# Patient Record
Sex: FEMALE | Race: WHITE | NOT HISPANIC OR LATINO | Employment: OTHER | URBAN - METROPOLITAN AREA
[De-identification: names, ages, dates, MRNs, and addresses within clinical notes are randomized per-mention and may not be internally consistent; named-entity substitution may affect disease eponyms.]

---

## 2017-05-10 ENCOUNTER — ALLSCRIPTS OFFICE VISIT (OUTPATIENT)
Dept: OTHER | Facility: OTHER | Age: 77
End: 2017-05-10

## 2017-08-16 ENCOUNTER — ALLSCRIPTS OFFICE VISIT (OUTPATIENT)
Dept: OTHER | Facility: OTHER | Age: 77
End: 2017-08-16

## 2017-08-16 DIAGNOSIS — Z00.00 ENCOUNTER FOR GENERAL ADULT MEDICAL EXAMINATION WITHOUT ABNORMAL FINDINGS: ICD-10-CM

## 2017-08-16 DIAGNOSIS — Z13.220 ENCOUNTER FOR SCREENING FOR LIPOID DISORDERS: ICD-10-CM

## 2017-08-16 DIAGNOSIS — Z13.1 ENCOUNTER FOR SCREENING FOR DIABETES MELLITUS: ICD-10-CM

## 2017-08-16 DIAGNOSIS — I10 ESSENTIAL (PRIMARY) HYPERTENSION: ICD-10-CM

## 2018-01-11 NOTE — RESULT NOTES
Verified Results  (1) COMPREHENSIVE METABOLIC PANEL [id number] 98:57ZW [facility] Show Order Number: [id number]     Test Name Result Flag Reference   GLUCOSE,RANDM 97 mg/dL     If the patient is fasting, the ADA then defines impaired fasting glucose as > 100 mg/dL and diabetes as > or equal to 123 mg/dL  SODIUM 138 mmol/L  136-145   POTASSIUM 3 6 mmol/L  3 5-5 3   CHLORIDE 102 mmol/L  100-108   CARBON DIOXIDE 31 mmol/L  21-32   ANION GAP (CALC) 5 mmol/L  4-13   BLOOD UREA NITROGEN 14 mg/dL  5-25   CREATININE 0 81 mg/dL  0 60-1 30   Standardized to IDMS reference method   CALCIUM 9 4 mg/dL  8 3-10 1   BILI, TOTAL 0 50 mg/dL  0 20-1 00   ALK PHOSPHATAS 94 U/L     ALT (SGPT) 18 U/L  12-78   AST(SGOT) 10 U/L  5-45   ALBUMIN 3 8 g/dL  3 5-5 0   TOTAL PROTEIN 7 3 g/dL  6 4-8 2   eGFR Non-African American      >60 0 ml/min/1 73sq m   - Patient Instructions: This is a fasting blood test  Water,black tea or black  coffee only after 9:00pm the night before test Drink 2 glasses of water the morning of test   National Kidney Disease Education Program recommendations are as follows:  GFR calculation is accurate only with a steady state creatinine  Chronic Kidney disease less than 60 ml/min/1 73 sq  meters  Kidney failure less than 15 ml/min/1 73 sq  meters  (1) LIPID PANEL, FASTING [id number] 01:53PM [facility] Show Order Number: [id number]     Test Name Result Flag Reference   CHOLESTEROL 194 mg/dL     HDL,DIRECT 30 mg/dL L 40-60   Specimen collection should occur prior to Metamizole administration due to the potential for falsely depressed results  LDL CHOLESTEROL CALCULATED 135 mg/dL H 0-100   - Patient Instructions: This is a fasting blood test  Water,black tea or black  coffee only after 9:00pm the night before test   Drink 2 glasses of water the morning of test     - Patient Instructions:  This is a fasting blood test  Water,black tea or black  coffee only after 9:00pm the night before test Drink 2 glasses of water the morning of test   Triglyceride:         Normal              <150 mg/dl       Borderline High    150-199 mg/dl       High               200-499 mg/dl       Very High          >499 mg/dl  Cholesterol:         Desirable        <200 mg/dl      Borderline High  200-239 mg/dl      High             >239 mg/dl  HDL Cholesterol:        High    >59 mg/dL      Low     <41 mg/dL  LDL CALCULATED:    This screening LDL is a calculated result  It does not have the accuracy of the Direct Measured LDL in the monitoring of patients with hyperlipidemia and/or statin therapy  Direct Measure LDL (QTD364) must be ordered separately in these patients  TRIGLYCERIDES 146 mg/dL  <=150   Specimen collection should occur prior to N-Acetylcysteine or Metamizole administration due to the potential for falsely depressed results

## 2018-01-13 VITALS
TEMPERATURE: 98.1 F | BODY MASS INDEX: 23.58 KG/M2 | HEART RATE: 77 BPM | SYSTOLIC BLOOD PRESSURE: 118 MMHG | DIASTOLIC BLOOD PRESSURE: 64 MMHG | WEIGHT: 131 LBS | OXYGEN SATURATION: 98 %

## 2018-01-15 NOTE — PROGRESS NOTES
Assessment    1  Encounter for preventive health examination (V70 0) (Z00 00)   2  Screening for diabetes mellitus (V77 1) (Z13 1)   3  Encounter for lipid screening for cardiovascular disease (V77 91,V81 2) (Z13 220,Z13 6)   4  Need for pneumococcal vaccination (V03 82) (Z23)    Plan  Alzheimers disease    · TraZODone HCl - 50 MG Oral Tablet; TAKE ONE TABLET AT BEDTIME AS  NEEDED TO HELP SLEEP  Benign essential hypertension, Encounter for lipid screening for cardiovascular disease,  Health Maintenance, Screening for diabetes mellitus    · (1) LIPID PANEL, FASTING; Status:Active; Requested for:[id number];   Depression screening    · *VB - PHQ-9 Tool; Status:Complete - Retrospective Authorization;   Done: [id number]  03:17PM  Encounter for lipid screening for cardiovascular disease, Health Maintenance, Screening  for diabetes mellitus    · (1) COMPREHENSIVE METABOLIC PANEL; Status:Active; Requested for:[id number];   Need for pneumococcal vaccination    · Prevnar 13 Intramuscular Suspension; INJECT 0 5  ML Intramuscular; To Be  Done: [id number]    Discussion/Summary  Impression: Subsequent Annual Wellness Visit, with preventive exam as well as age and risk appropriate counseling completed  Cardiovascular screening and counseling: the risks and benefits of screening were discussed, counseling was given on maintaining a healthy diet, counseling was given on maintaining a healthy weight, counseling was given on ways to improve cholesterol, counseling was given on ways to improve blood pressure, counseling was given on ways to improve exercise tolerance, due for a lipid panel and Dx - V81 2 Screen for CV Disorder  Diabetes screening and counseling: the risks and benefits of screening were discussed, counseling was given on maintaining a healthy diet, counseling was given on maintaining a healthy weight, counseling was given on ways to improve physical activity, due for blood glucose and Dx - V77 1 Screen for DM  Colorectal cancer screening and counseling: the risks and benefits of screening were discussed and the patient declines screening  Breast cancer screening and counseling: the risks and benefits of screening were discussed and the patient declines screening  Cervical cancer screening and counseling: screening not indicated  Osteoporosis screening and counseling: the risks and benefits of screening were discussed and the patient declines screening  Hepatitis C Screening: the patient was counseled on Hepatitis C screening  The patient declines Hepatitis C screening  Immunizations: the risks and benefits of pneumococcal vaccination were discussed with the patient and pneumococcal vaccine due today  Advance Directive Planning: complete and up to date  Patient Discussion: plan discussed with the patient, follow-up visit needed in 6 mo  Possible side effects of new medications were reviewed with the patient/guardian today  The treatment plan was reviewed with the patient/guardian  The patient/guardian understands and agrees with the treatment plan      Chief Complaint  MEDICARE WELLNESS VISIT      History of Present Illness  HPI: Pt with advanced Alzheimers disease  Family chooses to defer mammo, colonoscopy, DXA scans        Welcome to Medicare and Wellness Visits: The patient is being seen for the welcome to medicare visit  Medicare Screening and Risk Factors   Hospitalizations: no previous hospitalizations  Medicare Screening Tests Risk Questions   Drug and Alcohol Use: The patient has never smoked cigarettes  The patient reports never drinking alcohol  She has never used illicit drugs  Diet and Physical Activity: Current diet includes well balanced meals, 1 servings of whole grains per day and WATER ALL DAY  The patient does not exercise  Exercise: walking, VERY LITTLE     Mood Disorder and Cognitive Impairment Screening:   Cognitive impairment screening: difficulty learning/retaining new information, difficulty handling complex tasks, difficulty with reasoning, denies difficulty with spatial ability and orientation, difficulty with language and denies difficulty with behavior  Functional Ability/Level of Safety: Activities of daily living details: needs help using the phone, transportation help needed, needs help shopping, meal preparation help needed, needs help doing housework, needs help doing laundry, needs help managing medications and needs help managing money  Injury History: no previous fall  Home safety risk factors:  no loose rugs, no poor household lighting, no uneven floors and grab bars in the bathroom  Advance Directives: Advance directives: living will and advance directives  Co-Managers and Medical Equipment/Suppliers: See Patient Care Team      Patient Care Team    Care Team Member Role Specialty Office Number   Tamara Pyle   [phone], [employer]   [phone]   Conchita CORCORAN  Urology [phone], Evert Auhmada DO  Family Medicine [phone]     Review of Systems    Constitutional: no fever, no chills, no malaise, no fatigue and no anorexia  Head and Face: no facial pain and no facial pressure  Eyes: no eye pain and no blurred vision  ENT: no earache, no nasal congestion and no sore throat  Cardiovascular: no chest pain, no palpitations and no lower extremity edema  Respiratory: no shortness of breath, no wheezing and no cough  Gastrointestinal: no abdominal pain, no nausea, no vomiting, no diarrhea, no constipation, no bright red blood per rectum and no melena  Genitourinary: no dysuria, no urinary frequency, no urinary urgency and no flank pain  Musculoskeletal: no diffuse joint pain, no generalized muscle aches and no back pain  Integumentary and Breasts: no rashes and no skin lesions  Neurological: confusion, but no headache, no dizziness and no fainting  Psychiatric: no anxiety and no depression     Endocrine: no hot flashes and no night sweats  Hematologic and Lymphatic: no swollen glands, no tendency for easy bleeding, no tendency for easy bruising and no jaundice  Over the past 2 weeks, how often have you been bothered by the following problems? 1 ) Little interest or pleasure in doing things? Not at all    2 ) Feeling down, depressed or hopeless? Not at all  Active Problems    1  Alzheimers disease (331 0) (G30 9)   2  Benign essential hypertension (401 1) (I10)   3  Depression (311) (F32 9)   4  Flu vaccine need (V04 81) (Z23)   5  Increased frequency of urination (788 41) (R35 0)   6  Screening for genitourinary condition (V81 6) (Z13 89)   7  Screening for neurological condition (V80 09) (Z13 89)    Past Medical History    · History of Acute cystitis without hematuria (595 0) (N30 00)   · History of diverticulitis of colon (V12 79) (Z87 19)   · History of polymyalgia rheumatica (V13 59) (Z87 39)   · History of vertigo (V12 49) (S20 043)   · History of Hyperthyroidism (242 90) (E05 90)    The active problems and past medical history were reviewed and updated today  Surgical History    · History of Complete Colonoscopy   · History of Partial Colectomy - Sigmoid   · History of Thyroid Surgery Clint-Thyroidectomy   · History of Total Abdominal Hysterectomy With Removal Of Both Ovaries    The surgical history was reviewed and updated today  Family History  Mother    · Family history of Alzheimer Disease  Father    · Family history of Acute Myocardial Infarction (V17 3)   · Family history of Epilepsy And Recurrent Seizures (V17 2)  Brother    · Family history of Cancer    The family history was reviewed and updated today  Social History    · Being A Social Drinker   · Educational Level - Has High School Diploma   · Marital History - Currently    · 5 children   · Never A Smoker   · Occupation: Retired   · Office work for [employer]  The social history was reviewed and updated today   The social history was reviewed and is unchanged  Current Meds   1  Aspirin Low Dose 81 MG TABS; TAKE 1 TABLET DAILY; Therapy: [id number] to (Kaylin Urbano) Recorded   2  Melatonin 1 MG Oral Tablet; take 2 mg; Therapy: (Recorded:10May2017) to Recorded   3  TraZODone HCl - 50 MG Oral Tablet; TAKE ONE TABLET AT BEDTIME AS NEEDED TO   HELP SLEEP; Last Rx:15May2017 Ordered    The medication list was reviewed and updated today  Allergies    1  No Known Drug Allergies    Immunizations   ** Printed in Appendix #1 below  Vitals  Signs    Temperature: 98 2 F, Tympanic  Heart Rate: 66  Respiration: 14  Systolic: 894, RUE, Sitting  Diastolic: 68, RUE, Sitting  Height: 5 ft 3 in  Weight: 124 lb 5 oz  BMI Calculated: 22 02  BSA Calculated: 1 58  O2 Saturation: 95    Physical Exam    Constitutional   General appearance: No acute distress, well appearing and well nourished  Head and Face   Head and face: Normal     Palpation of the face and sinuses: No sinus tenderness  Eyes   Conjunctiva and lids: No swelling, erythema or discharge  Pupils and irises: Equal, round, reactive to light  Ears, Nose, Mouth, and Throat   External inspection of ears and nose: Normal     Otoscopic examination: Tympanic membranes translucent with normal light reflex  Canals patent without erythema  Hearing: Normal     Nasal mucosa, septum, and turbinates: Normal without edema or erythema  Lips, teeth, and gums: Normal, good dentition  Oropharynx: Normal with no erythema, edema, exudate or lesions  Neck   Neck: Supple, symmetric, trachea midline, no masses  Thyroid: Normal, no thyromegaly  Pulmonary   Respiratory effort: No increased work of breathing or signs of respiratory distress  Auscultation of lungs: Clear to auscultation  Cardiovascular   Auscultation of heart: Normal rate and rhythm, normal S1 and S2, no murmurs      Examination of extremities for edema and/or varicosities: Normal     Abdomen   Abdomen: Non-tender, no masses  Lymphatic   Palpation of lymph nodes in neck: No lymphadenopathy  Musculoskeletal   Gait and station: Normal     Digits and nails: Normal without clubbing or cyanosis  Joints, bones, and muscles: Normal     Skin   Skin and subcutaneous tissue: Normal without rashes or lesions  Neurologic   Cranial nerves: Cranial nerves II-XII intact  Psychiatric   Orientation to person, place, and time: Abnormal   Mental Status: disoriented to person, disoriented to place, disoriented to time, inadequate knowledge of current events, inadequate knowledge regarding past history and inadequate knowledge regarding vocabulary  Mood and affect: Abnormal   Mood and Affect: flat        Results/Data  *VB - PHQ-9 Tool [id number] 03:17PM Everton Garcia     Test Name Result Flag Reference   PHQ-9 Adult Depression Score -     PHQ-9 Adult Depression Screening Negative         Signatures   Electronically signed by : Monserrat Hinton DO; Aug 16 2017  4:21PM EST                       (Author)    Appendix #1     Patient: Clorinda Kehr ; : 1940; MRN: [id number]      1 2 3 4 5    Influenza  2012 05-Dec-2013 03-Nov-2014 15-Dec-2015 07-Sep-2016    PPSV  2012

## 2018-01-22 VITALS
BODY MASS INDEX: 22.03 KG/M2 | OXYGEN SATURATION: 95 % | SYSTOLIC BLOOD PRESSURE: 118 MMHG | TEMPERATURE: 98.2 F | HEIGHT: 63 IN | WEIGHT: 124.31 LBS | RESPIRATION RATE: 14 BRPM | DIASTOLIC BLOOD PRESSURE: 68 MMHG | HEART RATE: 66 BPM

## 2018-02-05 DIAGNOSIS — G47.00 INSOMNIA, UNSPECIFIED TYPE: Primary | ICD-10-CM

## 2018-02-05 RX ORDER — TRAZODONE HYDROCHLORIDE 50 MG/1
1 TABLET ORAL
COMMUNITY
Start: 2017-09-11 | End: 2018-02-05 | Stop reason: SDUPTHER

## 2018-02-06 RX ORDER — TRAZODONE HYDROCHLORIDE 50 MG/1
50 TABLET ORAL
Qty: 30 TABLET | Refills: 0 | OUTPATIENT
Start: 2018-02-06 | End: 2018-03-09 | Stop reason: SDUPTHER

## 2018-03-09 DIAGNOSIS — G47.00 INSOMNIA, UNSPECIFIED TYPE: ICD-10-CM

## 2018-03-11 RX ORDER — TRAZODONE HYDROCHLORIDE 50 MG/1
50 TABLET ORAL
Qty: 30 TABLET | Refills: 0 | Status: SHIPPED | OUTPATIENT
Start: 2018-03-11 | End: 2018-04-09 | Stop reason: SDUPTHER

## 2018-04-09 DIAGNOSIS — G47.00 INSOMNIA, UNSPECIFIED TYPE: ICD-10-CM

## 2018-04-09 RX ORDER — TRAZODONE HYDROCHLORIDE 50 MG/1
TABLET ORAL
Qty: 30 TABLET | Refills: 0 | Status: SHIPPED | OUTPATIENT
Start: 2018-04-09 | End: 2018-05-08 | Stop reason: SDUPTHER

## 2018-05-08 DIAGNOSIS — G47.00 INSOMNIA, UNSPECIFIED TYPE: ICD-10-CM

## 2018-05-08 RX ORDER — TRAZODONE HYDROCHLORIDE 50 MG/1
TABLET ORAL
Qty: 30 TABLET | Refills: 0 | OUTPATIENT
Start: 2018-05-08

## 2018-05-09 DIAGNOSIS — G47.00 INSOMNIA, UNSPECIFIED TYPE: ICD-10-CM

## 2018-05-09 RX ORDER — TRAZODONE HYDROCHLORIDE 50 MG/1
50 TABLET ORAL
Qty: 30 TABLET | Refills: 0 | Status: SHIPPED | OUTPATIENT
Start: 2018-05-09 | End: 2018-05-09 | Stop reason: SDUPTHER

## 2018-05-09 RX ORDER — TRAZODONE HYDROCHLORIDE 50 MG/1
50 TABLET ORAL
Qty: 30 TABLET | Refills: 0 | Status: SHIPPED | OUTPATIENT
Start: 2018-05-09 | End: 2018-06-06 | Stop reason: SDUPTHER

## 2018-06-05 ENCOUNTER — OFFICE VISIT (OUTPATIENT)
Dept: FAMILY MEDICINE CLINIC | Facility: CLINIC | Age: 78
End: 2018-06-05
Payer: MEDICARE

## 2018-06-05 DIAGNOSIS — K59.00 CONSTIPATION, UNSPECIFIED CONSTIPATION TYPE: Chronic | ICD-10-CM

## 2018-06-05 DIAGNOSIS — G30.1 LATE ONSET ALZHEIMER'S DISEASE WITHOUT BEHAVIORAL DISTURBANCE (HCC): Primary | Chronic | ICD-10-CM

## 2018-06-05 DIAGNOSIS — F51.01 PRIMARY INSOMNIA: Chronic | ICD-10-CM

## 2018-06-05 DIAGNOSIS — F02.80 LATE ONSET ALZHEIMER'S DISEASE WITHOUT BEHAVIORAL DISTURBANCE (HCC): Primary | Chronic | ICD-10-CM

## 2018-06-05 PROCEDURE — 99203 OFFICE O/P NEW LOW 30 MIN: CPT | Performed by: FAMILY MEDICINE

## 2018-06-05 NOTE — PROGRESS NOTES
Assessment/Plan:    No problem-specific Assessment & Plan notes found for this encounter  Diagnoses and all orders for this visit:    Late onset Alzheimer's disease without behavioral disturbance  Comments:  Stable; pt with good support network at home - at this time appears safe  Family aware that this is a progressive disease  Primary insomnia  Comments:  Stable at this time with Trazodone and OTC Melatonin  Constipation, unspecified constipation type  Comments:  Intermittent, stable  Can consider OTC Metamucil in the AMs with continued good PO hydration; OTC Miralax PRN  Subjective:      Patient ID: Beatriz Wheatley is a 76 y o  female  Pt presents today with her  (Wenceslao) as a new pt to the clinic, along with one of their daughters  All hx comes from [facility] and their daughter, as Zandra Romero has a hx of advanced Alzheimer's Disease  She is so far doing well in their home -> she has had no falls  Wenceslao is her primary caregiver, and they have a home health aide that comes in twice weekly for respite care  Annalisa's appetite is fair, and she hydrates well regularly  She sleeps through the night (approx 12 hours) with Trazodone and 5mg Melatonin at night  Wenceslao had all Alzheimer's type meds discontinued, as they were not helping  He is aware that she has a progressive disease, but reports that things are manageable at this time for him  Immunizations are UTD          The following portions of the patient's history were reviewed and updated as appropriate: allergies, current medications, past family history, past social history, past surgical history and problem list     Past Medical History:   Diagnosis Date    Acute cystitis without hematuria     Last assessed 12/19/15    Diverticulitis of colon     Hyperthyroidism     Polymyalgia rheumatica ([facility] [address] )     Vertigo     last assessed 01/15/2013     Past Surgical History:   Procedure Laterality Date    COLECTOMY      partial-sigmoid    COLONOSCOPY      THYROID SURGERY      Clint-Thyroidectomy    TOTAL ABDOMINAL HYSTERECTOMY W/ BILATERAL SALPINGOOPHORECTOMY         Current Outpatient Prescriptions:     traZODone (DESYREL) 50 mg tablet, Take 1 tablet (50 mg total) by mouth daily at bedtime as needed for sleep, Disp: 30 tablet, Rfl: 0    Allergies not on file    Family History   Problem Relation Age of Onset    Alzheimer's disease Mother     Heart attack Father     Seizures Father     Cancer Brother      mass in axilla     Social History   Substance Use Topics    Smoking status: Never Smoker    Smokeless tobacco: Not on file    Alcohol use Yes      Comment: social         Review of Systems   Constitutional: Negative for activity change  No recent illnesses  HENT: Negative for congestion  Respiratory: Negative for cough  Gastrointestinal: Positive for constipation  Occasional constipation - aided with PRN OTC Miralax  Psychiatric/Behavioral: Negative for agitation and sleep disturbance  Objective: There were no vitals taken for this visit  BP = 120/72 LA standing  Physical Exam   Constitutional: She appears well-developed and well-nourished  No distress  Pt alert but confused, moving about the room often, pleasant  Pt speaks only occasionally, one word at a time  HENT:   Head: Normocephalic and atraumatic  Eyes: Conjunctivae are normal    Cardiovascular: Normal rate, regular rhythm and normal heart sounds  Exam reveals no gallop and no friction rub  No murmur heard  Pulmonary/Chest: Effort normal and breath sounds normal  No respiratory distress  She has no wheezes  She has no rales  Neurological: She is alert  Skin: She is not diaphoretic  Nursing note and vitals reviewed

## 2018-06-06 DIAGNOSIS — G47.00 INSOMNIA, UNSPECIFIED TYPE: ICD-10-CM

## 2018-06-06 RX ORDER — TRAZODONE HYDROCHLORIDE 50 MG/1
50 TABLET ORAL
Qty: 90 TABLET | Refills: 1 | Status: SHIPPED | OUTPATIENT
Start: 2018-06-06 | End: 2018-07-02 | Stop reason: SDUPTHER

## 2018-07-02 DIAGNOSIS — G47.00 INSOMNIA, UNSPECIFIED TYPE: ICD-10-CM

## 2018-07-02 RX ORDER — TRAZODONE HYDROCHLORIDE 50 MG/1
50 TABLET ORAL
Qty: 90 TABLET | Refills: 1 | Status: SHIPPED | OUTPATIENT
Start: 2018-07-02 | End: 2018-10-10 | Stop reason: HOSPADM

## 2018-07-03 DIAGNOSIS — G47.00 INSOMNIA, UNSPECIFIED TYPE: ICD-10-CM

## 2018-07-03 RX ORDER — TRAZODONE HYDROCHLORIDE 50 MG/1
TABLET ORAL
Qty: 30 TABLET | Refills: 0 | OUTPATIENT
Start: 2018-07-03

## 2018-10-03 ENCOUNTER — HOSPITAL ENCOUNTER (EMERGENCY)
Facility: HOSPITAL | Age: 78
Discharge: HOME | End: 2018-10-03
Attending: EMERGENCY MEDICINE | Admitting: EMERGENCY MEDICINE
Payer: MEDICARE

## 2018-10-03 ENCOUNTER — APPOINTMENT (EMERGENCY)
Dept: CT IMAGING | Facility: HOSPITAL | Age: 78
End: 2018-10-03
Payer: MEDICARE

## 2018-10-03 ENCOUNTER — APPOINTMENT (EMERGENCY)
Dept: RADIOLOGY | Facility: HOSPITAL | Age: 78
End: 2018-10-03
Payer: MEDICARE

## 2018-10-03 VITALS
DIASTOLIC BLOOD PRESSURE: 83 MMHG | RESPIRATION RATE: 16 BRPM | HEART RATE: 57 BPM | SYSTOLIC BLOOD PRESSURE: 195 MMHG | OXYGEN SATURATION: 94 % | BODY MASS INDEX: 23.2 KG/M2 | WEIGHT: 130.95 LBS
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
## 2018-10-03 DIAGNOSIS — F03.90 DEMENTIA (HCC): ICD-10-CM

## 2018-10-03 PROCEDURE — 70450 CT HEAD/BRAIN W/O DYE: CPT

## 2018-10-03 PROCEDURE — 99284 EMERGENCY DEPT VISIT MOD MDM: CPT

## 2018-10-03 PROCEDURE — 72125 CT NECK SPINE W/O DYE: CPT

## 2018-10-03 PROCEDURE — 71046 X-RAY EXAM CHEST 2 VIEWS: CPT

## 2018-10-03 PROCEDURE — 72170 X-RAY EXAM OF PELVIS: CPT

## 2018-10-03 RX ORDER — ASPIRIN 81 MG/1
81 TABLET, CHEWABLE ORAL DAILY
COMMUNITY
End: 2019-05-03

## 2018-10-03 NOTE — ED PROVIDER NOTES
History  Chief Complaint   Patient presents with   Ernesto Chacon     from home via EMS after falling left side on floor,  called EMS, PT has alzheimers-takes ASA daily, C-collar on     76 YR FEMALE WITH DEMENTIA  [facility] [address]--   HEARD THUD THIS [address] --  NO RECENT ILLNESS/ NEW MEDS/ RECENT FALL --  FAMILY UNSURE OF ANY INJURY - ACTING AT HER BASELINE AT PRESENT         History provided by:  Spouse and relative  History limited by:  Dementia   used: No    Fall       Prior to Admission Medications   Prescriptions Last Dose Informant Patient Reported? Taking?   aspirin 81 mg chewable tablet   Yes Yes   Sig: Chew 81 mg daily   traZODone (DESYREL) 50 mg tablet   No Yes   Sig: Take 1 tablet (50 mg total) by mouth daily at bedtime as needed for sleep for up to 90 days      Facility-Administered Medications: None       Past Medical History:   Diagnosis Date    Acute cystitis without hematuria     Last assessed 12/19/15    Diverticulitis of colon     Hyperthyroidism     Polymyalgia rheumatica (HCC)     Vertigo     last assessed 01/15/2013       Past Surgical History:   Procedure Laterality Date    COLECTOMY      partial-sigmoid    COLONOSCOPY      THYROID SURGERY      Clint-Thyroidectomy    TOTAL ABDOMINAL HYSTERECTOMY W/ BILATERAL SALPINGOOPHORECTOMY         Family History   Problem Relation Age of Onset    Alzheimer's disease Mother     Heart attack Father     Seizures Father     Cancer Brother         mass in axilla     I have reviewed and agree with the history as documented  Social History   Substance Use Topics    Smoking status: Never Smoker    Smokeless tobacco: Never Used    Alcohol use Yes      Comment: social        Review of Systems   Constitutional: Negative  HENT: Negative  Eyes: Negative  Respiratory: Negative  Cardiovascular: Negative  Gastrointestinal: Negative  Endocrine: Negative  Genitourinary: Negative  Musculoskeletal: Negative  Skin: Negative  Allergic/Immunologic: Negative  Neurological: Negative  Hematological: Negative  Psychiatric/Behavioral: Negative  Physical Exam  Physical Exam   Constitutional: No distress  VSS- HTNSIVE-- PULSE OX 94 % ON RA- INTERPRETATION IS NORMAL- NO INTERVENTION    HENT:   Head: Normocephalic and atraumatic  Eyes: Pupils are equal, round, and reactive to light  Conjunctivae and EOM are normal  Right eye exhibits no discharge  Left eye exhibits no discharge  No scleral icterus  MM PINK   Neck: Normal range of motion  Neck supple  No JVD present  No tracheal deviation present  No thyromegaly present  NIO PMT C/T/L/S SPINE   Cardiovascular: Regular rhythm, normal heart sounds and intact distal pulses  Exam reveals no gallop and no friction rub  No murmur heard  Pulmonary/Chest: Effort normal and breath sounds normal  No stridor  No respiratory distress  She has no wheezes  She has no rales  She exhibits no tenderness    / LEFT  LOWER ANT AXILLARY LIEN VERY SMALL AREA OF ECCHYMOSIS- NT -    Abdominal: Soft  Bowel sounds are normal  She exhibits no distension and no mass  There is no tenderness  There is no rebound and no guarding  No hernia  SOFT- NT- NO PERITONEAL SIGNS--    Musculoskeletal: Normal range of motion  She exhibits no edema, tenderness or deformity  EQUAL BILATERAL RADIAL/DP PULSES- NO BLE EDEMA/CALF TENDERNESS/ASSYM/ ERYTHEMA    - MOVING  BUE/BLE PAIN FREEE- OLD DORSUM OF LEFT HAND ECCHYMOSIS- NOT FROM TODAY - LEFT HAND NT   Lymphadenopathy:     She has no cervical adenopathy  Neurological: She is alert  No cranial nerve deficit or sensory deficit  She exhibits normal muscle tone  Coordination normal    Skin: Skin is warm  Capillary refill takes less than 2 seconds  No rash noted  She is not diaphoretic  No erythema  No pallor     Psychiatric: She has a normal mood and affect  Her behavior is normal    Nursing note and vitals reviewed  Vital Signs  ED Triage Vitals [10/03/18 1703]   Temp Pulse Respirations Blood Pressure SpO2   -- 57 16 (!) 181/77 94 %      Temp src Heart Rate Source Patient Position - Orthostatic VS BP Location FiO2 (%)   -- Monitor Lying Right arm --      Pain Score       --           Vitals:    10/03/18 1703 10/03/18 1715   BP: (!) 181/77 (!) 195/83   Pulse: 57    Patient Position - Orthostatic VS: Lying        Visual Acuity      ED Medications  Medications - No data to display    Diagnostic Studies  Results Reviewed     None                 CT head without contrast    (Results Pending)   CT cervical spine without contrast    (Results Pending)   XR chest pa & lateral    (Results Pending)   XR pelvis ap only 1 or 2 views    (Results Pending)              Procedures  Procedures       Phone Contacts  ED Phone Contact    ED Course  ED Course as of Oct 03 1839   Wed Oct 03, 2018   1759 CXR PA/LAT-  NO FREE/SQ AIR- NO PTX/ PULM CONTUSION RIB FX SEEN     - PELVIS XRAY - NO FX SEEN     1833 - ER MD NOTE- PT TOLERATED AMBULATION AT BASELINE WITH ER MD AND                                 MDM  CritCare Time    Disposition  Final diagnoses:   None     ED Disposition     None      Follow-up Information    None         Patient's Medications   Discharge Prescriptions    No medications on file     No discharge procedures on file      ED Provider  Electronically Signed by           Freda Sunshine MD  10/03/18 1465       Freda Sunshine MD  10/04/18 4318

## 2018-10-03 NOTE — ED NOTES
Patient transported to [facility]  10/03/18 8470

## 2018-10-04 ENCOUNTER — TELEPHONE (OUTPATIENT)
Dept: FAMILY MEDICINE CLINIC | Facility: CLINIC | Age: 78
End: 2018-10-04

## 2018-10-04 NOTE — TELEPHONE ENCOUNTER
Patient's  called as his adrian Groves fell at home yesterday and was admitted to the ER  There she had x-rays and CT scans done but they did not find anything wrong with her and sent her back  Now the patient is in a lot of pain, and can hardly move   is looking for what you would advise him to do for his wife, he is concerned  I asked if he would like her to have an appointment and he worries that he cannot move her to get her here  please advise

## 2018-10-04 NOTE — TELEPHONE ENCOUNTER
If she is having trouble moving at all, and the pain is that excrutiating, I recommend him calling EMS  They would be able to place her on a mobile stretcher for transport  Pain medication can be prescribed, but if she is in that bad of shape, ER evaluation again is recommended  Thanks     David

## 2018-10-04 NOTE — TELEPHONE ENCOUNTER
Patient is going to see how she does tomorrow and if she starts to move then she will be okay  But if he has trouble moving her tomorrow then he will decide what should happen  He has given her a tylenol to see how she does

## 2018-10-07 ENCOUNTER — APPOINTMENT (EMERGENCY)
Dept: CT IMAGING | Facility: HOSPITAL | Age: 78
DRG: 689 | End: 2018-10-07
Payer: MEDICARE

## 2018-10-07 ENCOUNTER — HOSPITAL ENCOUNTER (INPATIENT)
Facility: HOSPITAL | Age: 78
LOS: 3 days | Discharge: NURSING, REHAB OR LONG-TERM CARE | DRG: 689 | End: 2018-10-10
Attending: EMERGENCY MEDICINE | Admitting: INTERNAL MEDICINE
Payer: MEDICARE

## 2018-10-07 DIAGNOSIS — K56.41 FECAL IMPACTION IN RECTUM (HCC): ICD-10-CM

## 2018-10-07 DIAGNOSIS — K59.00 CONSTIPATION, UNSPECIFIED CONSTIPATION TYPE: ICD-10-CM

## 2018-10-07 DIAGNOSIS — N39.0 URINARY TRACT INFECTION: ICD-10-CM

## 2018-10-07 DIAGNOSIS — S32.010A CLOSED COMPRESSION FRACTURE OF L1 LUMBAR VERTEBRA: Primary | ICD-10-CM

## 2018-10-07 DIAGNOSIS — R62.7 ADULT FAILURE TO THRIVE: ICD-10-CM

## 2018-10-07 PROBLEM — G93.41 METABOLIC ENCEPHALOPATHY: Status: ACTIVE | Noted: 2018-10-07

## 2018-10-07 PROBLEM — N30.00 ACUTE CYSTITIS WITHOUT HEMATURIA: Status: ACTIVE | Noted: 2018-10-07

## 2018-10-07 PROBLEM — E86.0 DEHYDRATION: Status: ACTIVE | Noted: 2018-10-07

## 2018-10-07 LAB
ALBUMIN SERPL BCP-MCNC: 3.4 G/DL (ref 3.5–5)
ALP SERPL-CCNC: 109 U/L (ref 46–116)
ALT SERPL W P-5'-P-CCNC: 30 U/L (ref 12–78)
ANION GAP SERPL CALCULATED.3IONS-SCNC: 9 MMOL/L (ref 4–13)
AST SERPL W P-5'-P-CCNC: 19 U/L (ref 5–45)
ATRIAL RATE: 57 BPM
BACTERIA UR QL AUTO: ABNORMAL /HPF
BASOPHILS # BLD AUTO: 0.05 THOUSANDS/ΜL (ref 0–0.1)
BASOPHILS NFR BLD AUTO: 1 % (ref 0–1)
BILIRUB SERPL-MCNC: 0.8 MG/DL (ref 0.2–1)
BILIRUB UR QL STRIP: NEGATIVE
BUN SERPL-MCNC: 31 MG/DL (ref 5–25)
CALCIUM SERPL-MCNC: 9.7 MG/DL (ref 8.3–10.1)
CHLORIDE SERPL-SCNC: 104 MMOL/L (ref 100–108)
CLARITY UR: ABNORMAL
CO2 SERPL-SCNC: 30 MMOL/L (ref 21–32)
COLOR UR: YELLOW
CREAT SERPL-MCNC: 0.69 MG/DL (ref 0.6–1.3)
EOSINOPHIL # BLD AUTO: 0.05 THOUSAND/ΜL (ref 0–0.61)
EOSINOPHIL NFR BLD AUTO: 1 % (ref 0–6)
ERYTHROCYTE [DISTWIDTH] IN BLOOD BY AUTOMATED COUNT: 12.7 % (ref 11.6–15.1)
GFR SERPL CREATININE-BSD FRML MDRD: 83 ML/MIN/1.73SQ M
GLUCOSE SERPL-MCNC: 118 MG/DL (ref 65–140)
GLUCOSE UR STRIP-MCNC: NEGATIVE MG/DL
HCT VFR BLD AUTO: 47.7 % (ref 34.8–46.1)
HGB BLD-MCNC: 15.5 G/DL (ref 11.5–15.4)
HGB UR QL STRIP.AUTO: ABNORMAL
IMM GRANULOCYTES # BLD AUTO: 0.04 THOUSAND/UL (ref 0–0.2)
IMM GRANULOCYTES NFR BLD AUTO: 0 % (ref 0–2)
KETONES UR STRIP-MCNC: NEGATIVE MG/DL
LACTATE SERPL-SCNC: 1.3 MMOL/L (ref 0.5–2)
LEUKOCYTE ESTERASE UR QL STRIP: ABNORMAL
LYMPHOCYTES # BLD AUTO: 0.98 THOUSANDS/ΜL (ref 0.6–4.47)
LYMPHOCYTES NFR BLD AUTO: 10 % (ref 14–44)
MCH RBC QN AUTO: 31.6 PG (ref 26.8–34.3)
MCHC RBC AUTO-ENTMCNC: 32.5 G/DL (ref 31.4–37.4)
MCV RBC AUTO: 97 FL (ref 82–98)
MONOCYTES # BLD AUTO: 0.75 THOUSAND/ΜL (ref 0.17–1.22)
MONOCYTES NFR BLD AUTO: 8 % (ref 4–12)
NEUTROPHILS # BLD AUTO: 7.68 THOUSANDS/ΜL (ref 1.85–7.62)
NEUTS SEG NFR BLD AUTO: 80 % (ref 43–75)
NITRITE UR QL STRIP: POSITIVE
NON-SQ EPI CELLS URNS QL MICRO: ABNORMAL /HPF
NRBC BLD AUTO-RTO: 0 /100 WBCS
P AXIS: 76 DEGREES
PH UR STRIP.AUTO: 6.5 [PH] (ref 4.5–8)
PLATELET # BLD AUTO: 286 THOUSANDS/UL (ref 149–390)
PMV BLD AUTO: 8.8 FL (ref 8.9–12.7)
POTASSIUM SERPL-SCNC: 3.4 MMOL/L (ref 3.5–5.3)
PR INTERVAL: 166 MS
PROT SERPL-MCNC: 7.5 G/DL (ref 6.4–8.2)
PROT UR STRIP-MCNC: ABNORMAL MG/DL
QRS AXIS: 51 DEGREES
QRSD INTERVAL: 80 MS
QT INTERVAL: 430 MS
QTC INTERVAL: 418 MS
RBC # BLD AUTO: 4.91 MILLION/UL (ref 3.81–5.12)
RBC #/AREA URNS AUTO: ABNORMAL /HPF
SODIUM SERPL-SCNC: 143 MMOL/L (ref 136–145)
SP GR UR STRIP.AUTO: 1.02 (ref 1–1.03)
T WAVE AXIS: 62 DEGREES
TROPONIN I SERPL-MCNC: 0.02 NG/ML
UROBILINOGEN UR QL STRIP.AUTO: 1 E.U./DL
VENTRICULAR RATE: 57 BPM
WBC # BLD AUTO: 9.55 THOUSAND/UL (ref 4.31–10.16)
WBC #/AREA URNS AUTO: ABNORMAL /HPF

## 2018-10-07 PROCEDURE — 85025 COMPLETE CBC W/AUTO DIFF WBC: CPT | Performed by: EMERGENCY MEDICINE

## 2018-10-07 PROCEDURE — 99223 1ST HOSP IP/OBS HIGH 75: CPT | Performed by: INTERNAL MEDICINE

## 2018-10-07 PROCEDURE — 87040 BLOOD CULTURE FOR BACTERIA: CPT | Performed by: EMERGENCY MEDICINE

## 2018-10-07 PROCEDURE — 71260 CT THORAX DX C+: CPT

## 2018-10-07 PROCEDURE — 96361 HYDRATE IV INFUSION ADD-ON: CPT

## 2018-10-07 PROCEDURE — 99285 EMERGENCY DEPT VISIT HI MDM: CPT

## 2018-10-07 PROCEDURE — 84484 ASSAY OF TROPONIN QUANT: CPT | Performed by: EMERGENCY MEDICINE

## 2018-10-07 PROCEDURE — 1123F ACP DISCUSS/DSCN MKR DOCD: CPT | Performed by: PHYSICIAN ASSISTANT

## 2018-10-07 PROCEDURE — 74177 CT ABD & PELVIS W/CONTRAST: CPT

## 2018-10-07 PROCEDURE — 93010 ELECTROCARDIOGRAM REPORT: CPT | Performed by: INTERNAL MEDICINE

## 2018-10-07 PROCEDURE — 93005 ELECTROCARDIOGRAM TRACING: CPT

## 2018-10-07 PROCEDURE — 80053 COMPREHEN METABOLIC PANEL: CPT | Performed by: EMERGENCY MEDICINE

## 2018-10-07 PROCEDURE — 96365 THER/PROPH/DIAG IV INF INIT: CPT

## 2018-10-07 PROCEDURE — 36415 COLL VENOUS BLD VENIPUNCTURE: CPT | Performed by: EMERGENCY MEDICINE

## 2018-10-07 PROCEDURE — 81001 URINALYSIS AUTO W/SCOPE: CPT | Performed by: EMERGENCY MEDICINE

## 2018-10-07 PROCEDURE — 83605 ASSAY OF LACTIC ACID: CPT | Performed by: EMERGENCY MEDICINE

## 2018-10-07 RX ORDER — POLYETHYLENE GLYCOL 3350 17 G/17G
17 POWDER, FOR SOLUTION ORAL DAILY
Status: DISCONTINUED | OUTPATIENT
Start: 2018-10-07 | End: 2018-10-10 | Stop reason: HOSPADM

## 2018-10-07 RX ORDER — SODIUM CHLORIDE 9 MG/ML
100 INJECTION, SOLUTION INTRAVENOUS CONTINUOUS
Status: DISCONTINUED | OUTPATIENT
Start: 2018-10-07 | End: 2018-10-08

## 2018-10-07 RX ORDER — LIDOCAINE 50 MG/G
1 PATCH TOPICAL DAILY
Status: DISCONTINUED | OUTPATIENT
Start: 2018-10-07 | End: 2018-10-10 | Stop reason: HOSPADM

## 2018-10-07 RX ORDER — LANOLIN ALCOHOL/MO/W.PET/CERES
3 CREAM (GRAM) TOPICAL
Status: DISCONTINUED | OUTPATIENT
Start: 2018-10-07 | End: 2018-10-10 | Stop reason: HOSPADM

## 2018-10-07 RX ORDER — ASPIRIN 81 MG/1
81 TABLET, CHEWABLE ORAL DAILY
Status: DISCONTINUED | OUTPATIENT
Start: 2018-10-07 | End: 2018-10-10 | Stop reason: HOSPADM

## 2018-10-07 RX ORDER — ACETAMINOPHEN 325 MG/1
650 TABLET ORAL EVERY 6 HOURS SCHEDULED
Status: DISCONTINUED | OUTPATIENT
Start: 2018-10-07 | End: 2018-10-10

## 2018-10-07 RX ORDER — BISACODYL 10 MG
10 SUPPOSITORY, RECTAL RECTAL DAILY
Status: DISCONTINUED | OUTPATIENT
Start: 2018-10-07 | End: 2018-10-10 | Stop reason: HOSPADM

## 2018-10-07 RX ORDER — LANOLIN ALCOHOL/MO/W.PET/CERES
3 CREAM (GRAM) TOPICAL
COMMUNITY
End: 2019-05-03 | Stop reason: SDUPTHER

## 2018-10-07 RX ORDER — ONDANSETRON 2 MG/ML
4 INJECTION INTRAMUSCULAR; INTRAVENOUS EVERY 6 HOURS PRN
Status: DISCONTINUED | OUTPATIENT
Start: 2018-10-07 | End: 2018-10-10 | Stop reason: HOSPADM

## 2018-10-07 RX ADMIN — BISACODYL 10 MG: 10 SUPPOSITORY RECTAL at 15:36

## 2018-10-07 RX ADMIN — LIDOCAINE 1 PATCH: 50 PATCH CUTANEOUS at 15:36

## 2018-10-07 RX ADMIN — ENOXAPARIN SODIUM 40 MG: 40 INJECTION SUBCUTANEOUS at 15:36

## 2018-10-07 RX ADMIN — SODIUM CHLORIDE 1000 ML: 0.9 INJECTION, SOLUTION INTRAVENOUS at 11:26

## 2018-10-07 RX ADMIN — ACETAMINOPHEN 650 MG: 325 TABLET, FILM COATED ORAL at 23:40

## 2018-10-07 RX ADMIN — GLYCERIN 1 SUPPOSITORY: 2 SUPPOSITORY RECTAL at 14:16

## 2018-10-07 RX ADMIN — CEFTRIAXONE SODIUM 1000 MG: 10 INJECTION, POWDER, FOR SOLUTION INTRAVENOUS at 12:57

## 2018-10-07 RX ADMIN — ACETAMINOPHEN 650 MG: 325 TABLET, FILM COATED ORAL at 15:35

## 2018-10-07 RX ADMIN — SODIUM CHLORIDE 100 ML/HR: 0.9 INJECTION, SOLUTION INTRAVENOUS at 15:37

## 2018-10-07 RX ADMIN — IOHEXOL 100 ML: 350 INJECTION, SOLUTION INTRAVENOUS at 12:38

## 2018-10-07 RX ADMIN — MELATONIN TAB 3 MG 3 MG: 3 TAB at 23:40

## 2018-10-07 RX ADMIN — ASPIRIN 81 MG 81 MG: 81 TABLET ORAL at 15:35

## 2018-10-07 RX ADMIN — POLYETHYLENE GLYCOL 3350 17 G: 17 POWDER, FOR SOLUTION ORAL at 15:36

## 2018-10-07 NOTE — ASSESSMENT & PLAN NOTE
Likely secondary to dehydration and urinary tract infection with history of  severe Alzheimer's dementia    Patient nonverbal at baseline  CT head and recent prior with a to the emergency room was negative  Continue with IV hydration and antibiotics  Fall precaution  No further neurological workup for now

## 2018-10-07 NOTE — ED NOTES
Patient incontinent of urine upon arrival  Patient cleaned, new brief applied       Kathleen Rajput 134, RN  10/07/18 5030

## 2018-10-07 NOTE — PLAN OF CARE
Problem: Potential for Falls  Goal: Patient will remain free of falls  INTERVENTIONS:  - Assess patient frequently for physical needs  -  Identify cognitive and physical deficits and behaviors that affect risk of falls  -  [place] fall precautions as indicated by assessment   - Educate patient/family on patient safety including physical limitations  - Instruct patient to call for assistance with activity based on assessment  - Modify environment to reduce risk of injury  - Consider OT/PT consult to assist with strengthening/mobility   Outcome: Progressing      Problem: INFECTION - ADULT  Goal: Absence or prevention of progression during hospitalization  INTERVENTIONS:  - Assess and monitor for signs and symptoms of infection  - Monitor lab/diagnostic results  - Monitor all insertion sites, i e  indwelling lines, tubes, and drains  - Monitor endotracheal (as able) and nasal secretions for changes in amount and color  - [place] appropriate cooling/warming therapies per order  - Administer medications as ordered  - Instruct and encourage patient and family to use good hand hygiene technique  - Identify and instruct in appropriate isolation precautions for identified infection/condition  Outcome: Progressing      Problem: SAFETY ADULT  Goal: Patient will remain free of falls  INTERVENTIONS:  - Assess patient frequently for physical needs  -  Identify cognitive and physical deficits and behaviors that affect risk of falls    -  [place] fall precautions as indicated by assessment   - Educate patient/family on patient safety including physical limitations  - Instruct patient to call for assistance with activity based on assessment  - Modify environment to reduce risk of injury  - Consider OT/PT consult to assist with strengthening/mobility   Outcome: Progressing    Goal: Maintain or return to baseline ADL function  INTERVENTIONS:  -  Assess patient's ability to carry out ADLs; assess patient's baseline for ADL function and identify physical deficits which impact ability to perform ADLs (bathing, care of mouth/teeth, toileting, grooming, dressing, etc )  - Assess/evaluate cause of self-care deficits   - Assess range of motion  - Assess patient's mobility; develop plan if impaired  - Assess patient's need for assistive devices and provide as appropriate  - Encourage maximum independence but intervene and supervise when necessary  ¯ Involve family in performance of ADLs  ¯ Assess for home care needs following discharge   ¯ Request OT consult to assist with ADL evaluation and planning for discharge  ¯ Provide patient education as appropriate  Outcome: Progressing    Goal: Maintain or return mobility status to optimal level  INTERVENTIONS:  - Assess patient's baseline mobility status (ambulation, transfers, stairs, etc )    - Identify cognitive and physical deficits and behaviors that affect mobility  - Identify mobility aids required to assist with transfers and/or ambulation (gait belt, sit-to-stand, lift, walker, cane, etc )  - [place] fall precautions as indicated by assessment  - Record patient progress and toleration of activity level on Mobility SBAR; progress patient to next Phase/Stage  - Instruct patient to call for assistance with activity based on assessment  - Request Rehabilitation consult to assist with strengthening/weightbearing, etc   Outcome: Progressing

## 2018-10-07 NOTE — ASSESSMENT & PLAN NOTE
Likely sustained after the patient had a fall last week    Conservative management  Physical therapy consultation will be obtained  Lidocaine patch and scheduled Tylenol for pain control

## 2018-10-07 NOTE — ASSESSMENT & PLAN NOTE
Patient has increased confusion with underlying dementia  Urinalysis shows findings suggestive of urinary tract infection  Family noticed foul-smelling dark urine  No recent hospitalization or instrumentation    Continue with IV ceftriaxone pending urine culture results

## 2018-10-07 NOTE — H&P
H&P- Mela Eden 1940, 76 y o  female MRN: [id number]    Unit/Bed#: [facility] Encounter: [id number]    Primary Care Provider: Kristi Jurado DO   Date and time admitted to hospital: 10/7/2018 10:22 AM        * Metabolic encephalopathy   Assessment & Plan    Likely secondary to dehydration and urinary tract infection with history of  severe Alzheimer's dementia  Patient nonverbal at baseline  CT head and recent prior with a to the emergency room was negative  Continue with IV hydration and antibiotics  Fall precaution  No further neurological workup for now     Late onset Alzheimer's disease without behavioral disturbance   Assessment & Plan    Patient has history late onset Alzheimer's dementia and needs assistance with  activities of daily living  She has been taking care of at home by her   Currently not on any dementia medications  Continue supportive care  Fall precautions  Patient is a level 3 DNR     Acute cystitis without hematuria   Assessment & Plan    Patient has increased confusion with underlying dementia  Urinalysis shows findings suggestive of urinary tract infection  Family noticed foul-smelling dark urine  No recent hospitalization or instrumentation  Continue with IV ceftriaxone pending urine culture results     Dehydration   Assessment & Plan    Patient has dehydration and failure to thrive  Continue with IV fluids  She is a level 3 DNR but family is undecided regarding feeding tube  Continue with ongoing discussions regarding goals of care     Closed compression fracture of L1 lumbar vertebra [facility])   Assessment & Plan    Likely sustained after the patient had a fall last week  Conservative management  Physical therapy consultation will be obtained  Lidocaine patch and scheduled Tylenol for pain control     Constipation   Assessment & Plan    Patient has a history of chronic constipation and takes MiraLax and Metamucil at home    Last bowel movement was on 10/2   did not report any nausea vomiting  Continue with IV hydration  Dulcolax suppository  May need fecal disimpaction           VTE Prophylaxis: Enoxaparin (Lovenox)  / sequential compression device   Code Status: Level 3 DNR  POLST: POLST form is not discussed and not completed at this time  Discussion with family:  Discussed with  and daughter at bedside  All questions addressed    Anticipated Length of Stay:  Patient will be admitted on an Inpatient basis with an anticipated length of stay of  >2 midnights  Justification for Hospital Stay:  IV hydration and antibiotics  Total Time for Visit, including Counseling / Coordination of Care: 30 minutes  Greater than 50% of this total time spent on direct patient counseling and coordination of care  Chief Complaint:   Poor oral intake  History of Present Illness:    Pamella Aguayo is a 76 y o  female who presents with late onset advanced Alzheimer's dementia and is nonverbal at baseline  She is taking care of at home by her   History was obtained from the  and daughter at bedside  At baseline the patient had been ambulatory without assistance till last week when she had a fall  She was brought to the emergency room and after trauma workup was discharged back home  Since then patient has been  increasingly lethargic with poor oral intake and failure to thrive  Family noticed that on moving her she was wincing in pain  No fever or chills were reported  She was refusing to eat or drink and has not been taking taking any medications for last 5 days  She was brought to the emergency room because of concern for dehydration  Family also noticed that her urine was dark and foul smelling  Her last bowel movement was on Tuesday  She usually takes Metamucil daily and as needed MiraLax for chronic constipation      Review of Systems:    Review of Systems   Unable to perform ROS: Dementia       Past Medical and Surgical History:     Past Medical History:   Diagnosis Date    Acute cystitis without hematuria     Last assessed 12/19/15    Diverticulitis of colon     Hyperthyroidism     Polymyalgia rheumatica (HCC)     Vertigo     last assessed 01/15/2013       Past Surgical History:   Procedure Laterality Date    COLECTOMY      partial-sigmoid    COLONOSCOPY      THYROID SURGERY      Clint-Thyroidectomy    TOTAL ABDOMINAL HYSTERECTOMY W/ BILATERAL SALPINGOOPHORECTOMY         Meds/Allergies:    Prior to Admission medications    Medication Sig Start Date End Date Taking? Authorizing Provider   aspirin 81 mg chewable tablet Chew 81 mg daily    Historical Provider, MD   traZODone (DESYREL) 50 mg tablet Take 1 tablet (50 mg total) by mouth daily at bedtime as needed for sleep for up to 90 days 7/2/18 10/3/18  [address] Denisa Koch,      Medications discussed with family at bedside    Allergies: No Known Allergies    Social History:     Marital Status: /Civil Union   Substance Use History:   History   Alcohol Use    Yes     Comment: social     History   Smoking Status    Never Smoker   Smokeless Tobacco    Never Used     History   Drug Use No       Family History:    non-contributory    Physical Exam:     Vitals:   Blood Pressure: 156/71 (10/07/18 1349)  Pulse: 76 (10/07/18 1349)  Temperature: 99 3 °F (37 4 °C) (10/07/18 1211)  Temp Source: Rectal (10/07/18 1211)  Respirations: 16 (10/07/18 1349)  Weight - Scale: 56 kg (123 lb 7 3 oz) (10/07/18 1027)  SpO2: 93 % (10/07/18 1349)    Physical Exam   Constitutional: No distress  HENT:   Head: Atraumatic  Dry mucous membranes   Eyes:   Not cooperative with exam   Neck: Neck supple  No JVD present  Cardiovascular: Normal rate and regular rhythm  Pulmonary/Chest: Effort normal    Abdominal: Soft  Bowel sounds are normal  She exhibits no distension  There is no tenderness  Musculoskeletal: She exhibits no edema or deformity     Neurological:   Patient has her eyes closed and resists opening them   Able to move extremities on painful stimulus  Skin: Skin is warm  She is not diaphoretic  Additional Data:     Lab Results: I have personally reviewed pertinent reports  Results from last 7 days  Lab Units 10/07/18  1125   WBC Thousand/uL 9 55   HEMOGLOBIN g/dL 15 5*   HEMATOCRIT % 47 7*   PLATELETS Thousands/uL 286   NEUTROS ABS Thousands/µL 7 68*   NEUTROS PCT % 80*   LYMPHS PCT % 10*   MONOS PCT % 8   EOS PCT % 1       Results from last 7 days  Lab Units 10/07/18  1125   SODIUM mmol/L 143   POTASSIUM mmol/L 3 4*   CHLORIDE mmol/L 104   CO2 mmol/L 30   BUN mg/dL 31*   CREATININE mg/dL 0 69   ANION GAP mmol/L 9   CALCIUM mg/dL 9 7   ALBUMIN g/dL 3 4*   TOTAL BILIRUBIN mg/dL 0 80   ALK PHOS U/L 109   ALT U/L 30   AST U/L 19                   Results from last 7 days  Lab Units 10/07/18  1125   LACTIC ACID mmol/L 1 3       Imaging: I have personally reviewed pertinent reports  CT chest abdomen pelvis w contrast   Final Result by Coco Earl MD (10/07 1333)   Addendum 1 of 1 by Coco Earl MD (10/07 1345)   ADDENDUM:      Purpose of addendum is to address the L1 compression fracture  Please see    report for CT reconstructions of the lumbar spine for further details  There is evidence of retropulsion  Final         1  Large stool ball within the rectum  Diverticulosis without active diverticulitis  No evidence of bowel obstruction  2   Suggestion of mild bladder wall thickening with some subtle mucosal hyperenhancement  While this may be related to underdistention, correlation with urinalysis recommended to exclude cystitis  3   Age-indeterminate compression fracture at L1 with approximately 30% height loss, appearance of which suggests likely subacute to chronic  Correlation for tenderness in this area recommended        Workstation performed: [id number]         CT recon only lumbar spine   Final Result by Coco Earl MD (10/07 1345)      L1 compression fracture with approximately 30% height loss and retropulsion of approximately 4 5 mm resulting in focal narrowing of the spinal canal at this level  This is age indeterminate and correlation for tenderness in this area recommended  The study was marked in [facility] for immediate notification  Workstation performed: [id number]             EKG, Pathology, and Other Studies Reviewed on Admission:   · EKG: NA    Allscripts / Epic Records Reviewed: Yes     ** Please Note: This note has been constructed using a voice recognition system   **

## 2018-10-07 NOTE — ED NOTES
Soiled odor noted from patient while in the room  Patient's brief checked, dry  Patient's brief changed       Sherley Lee RN  10/07/18 3418

## 2018-10-07 NOTE — ASSESSMENT & PLAN NOTE
Patient has a history of chronic constipation and takes MiraLax and Metamucil at home  Last bowel movement was on 10/2   did not report any nausea vomiting  Continue with IV hydration    Dulcolax suppository  May need fecal disimpaction

## 2018-10-07 NOTE — ED PROVIDER NOTES
History  Chief Complaint   Patient presents with    Failure To Thrive      called EMS, decreased PO intake since last hospital admission, generalized pain and weakness     47-year-old female with severe dementia presents to the emergency department for re-evaluation  Family is concerned that she is failing to thrive at home  She had a fall on Wednesday of last week  It is unclear how she fell but her  believes she tripped on a table and landed on her left side  She had x-rays and CT scans at that time was discharged back home  Since returning home the patient refuses to eat or drink  She has been refusing to lie on her left side and appears to be having pain when family attempts to move her  They have been able to get her up out of bed to walk to the bathroom on 2 occasions  The patient is mostly nonverbal   She has been wincing in pain with family attempts to clean her and move her  She seems to be most comfortable when lying on her right hip and shoulder  They have not noticed any new bruising  No reported fevers  Family concerned about dehydration  History provided by:  Patient, relative and medical records  History limited by:  Dementia   used: No    Malaise - 7 years or greater   Severity:  Severe  Onset quality:  Gradual  Timing:  Constant  Progression:  Unchanged  Chronicity:  New  Context: not change in medication and not recent infection    Relieved by:  Nothing  Worsened by:  Nothing  Ineffective treatments:  Pain relief and rest  Associated symptoms: difficulty walking    Associated symptoms: no syncope    Risk factors: neurologic disease    Risk factors: no new medications        Prior to Admission Medications   Prescriptions Last Dose Informant Patient Reported?  Taking?   aspirin 81 mg chewable tablet   Yes No   Sig: Chew 81 mg daily   melatonin 3 mg   Yes Yes   Sig: Take 3 mg by mouth daily at bedtime   traZODone (DESYREL) 50 mg tablet   No No Sig: Take 1 tablet (50 mg total) by mouth daily at bedtime as needed for sleep for up to 90 days      Facility-Administered Medications: None       Past Medical History:   Diagnosis Date    Acute cystitis without hematuria     Last assessed 12/19/15    Diverticulitis of colon     Hyperthyroidism     Polymyalgia rheumatica (HCC)     Vertigo     last assessed 01/15/2013       Past Surgical History:   Procedure Laterality Date    COLECTOMY      partial-sigmoid    COLONOSCOPY      THYROID SURGERY      Clint-Thyroidectomy    TOTAL ABDOMINAL HYSTERECTOMY W/ BILATERAL SALPINGOOPHORECTOMY         Family History   Problem Relation Age of Onset    Alzheimer's disease Mother     Heart attack Father     Seizures Father     Cancer Brother         mass in axilla     I have reviewed and agree with the history as documented  Social History   Substance Use Topics    Smoking status: Never Smoker    Smokeless tobacco: Never Used    Alcohol use No      Comment: social        Review of Systems   Unable to perform ROS: Dementia   Cardiovascular: Negative for syncope  Physical Exam  Physical Exam   Constitutional: She is oriented to person, place, and time  She appears well-developed and well-nourished  She appears distressed  Frail, elderly appearing, prefers to lay on her right hip winces when I attempt to roll over onto her back   HENT:   Head: Normocephalic  Nose: Nose normal    Mouth/Throat: Mucous membranes are dry  No oropharyngeal exudate  Eyes: Pupils are equal, round, and reactive to light  Conjunctivae and EOM are normal    Neck: Normal range of motion  Neck supple  Cardiovascular: Normal rate, regular rhythm, normal heart sounds and intact distal pulses  Pulmonary/Chest: Effort normal and breath sounds normal  No respiratory distress  She has no wheezes  Abdominal: Soft  Bowel sounds are normal  She exhibits no distension  There is no tenderness  There is no rebound and no guarding  Musculoskeletal: Normal range of motion  She exhibits no edema, tenderness or deformity  Back:    Lymphadenopathy:     She has no cervical adenopathy  Neurological: She is alert and oriented to person, place, and time  She has normal strength and normal reflexes  She displays no tremor  No cranial nerve deficit or sensory deficit  She exhibits normal muscle tone  She displays no seizure activity  Coordination normal    Skin: Skin is warm, dry and intact  No rash noted  Psychiatric: She has a normal mood and affect  Her behavior is normal  Judgment and thought content normal  Cognition and memory are impaired  Nonverbal   Nursing note and vitals reviewed        Vital Signs  ED Triage Vitals   Temperature Pulse Respirations Blood Pressure SpO2   10/07/18 1027 10/07/18 1027 10/07/18 1027 10/07/18 1027 10/07/18 1027   98 7 °F (37 1 °C) 61 18 (!) 187/77 91 %      Temp Source Heart Rate Source Patient Position - Orthostatic VS BP Location FiO2 (%)   10/07/18 1027 10/07/18 1027 10/07/18 1215 10/07/18 1027 --   Tympanic Monitor Lying Left arm       Pain Score       10/07/18 1027       No Pain           Vitals:    10/09/18 1629 10/09/18 2312 10/10/18 0700 10/10/18 1437   BP: 160/90 152/69 160/67 153/68   Pulse: 60 62 80 58   Patient Position - Orthostatic VS: Lying Lying Lying Lying       Visual Acuity      ED Medications  Medications   sodium chloride 0 9 % bolus 1,000 mL (0 mL Intravenous Stopped 10/7/18 1330)   iohexol (OMNIPAQUE) 350 MG/ML injection (MULTI-DOSE) 100 mL (100 mL Intravenous Given 10/7/18 1238)   ceftriaxone (ROCEPHIN) 1 g/50 mL in dextrose IVPB (0 mg Intravenous Stopped 10/7/18 1330)   glycerin (adult) rectal suppository 1 suppository (1 suppository Rectal Given 10/7/18 1416)   cefTRIAXone (ROCEPHIN) 1,000 mg in dextrose 5 % 50 mL IVPB (1,000 mg Intravenous New Bag 10/10/18 1236)   influenza vaccine, high-dose (FLUZONE HIGH-DOSE) IM injection KENISHA 0 5 mL (0 5 mL Intramuscular Given 10/8/18 1506)   potassium chloride (K-DUR,KLOR-CON) CR tablet 60 mEq (60 mEq Oral Given 10/9/18 1301)       Diagnostic Studies  Results Reviewed     Procedure Component Value Units Date/Time    Blood culture #1 [[id number]] Collected:  10/07/18 1130    Lab Status:  Preliminary result Specimen:  Blood from Arm, Left Updated:  10/11/18 1701     Blood Culture No Growth After 4 Days  Blood culture #2 [[id number]] Collected:  10/07/18 1125    Lab Status:  Preliminary result Specimen:  Blood from Arm, Right Updated:  10/11/18 1701     Blood Culture No Growth After 4 Days  Urine Microscopic [[id number]]  (Abnormal) Collected:  10/07/18 1212    Lab Status:  Final result Specimen:  Urine from Urine, Clean Catch Updated:  10/07/18 1233     RBC, UA 0-1 (A) /hpf      WBC, UA 4-10 (A) /hpf      Epithelial Cells Occasional /hpf      Bacteria, UA Innumerable (A) /hpf     UA w Reflex to Microscopic w Reflex to Culture [[id number]]  (Abnormal) Collected:  10/07/18 1212    Lab Status:  Final result Specimen:  Urine from Urine, Clean Catch Updated:  10/07/18 1218     Color, UA Yellow     Clarity, UA Turbid     Specific Gravity, UA 1 025     pH, UA 6 5     Leukocytes, UA Small (A)     Nitrite, UA Positive (A)     Protein, UA Trace (A) mg/dl      Glucose, UA Negative mg/dl      Ketones, UA Negative mg/dl      Urobilinogen, UA 1 0 E U /dl      Bilirubin, UA Negative     Blood, UA Moderate (A)    Lactic acid, plasma [[id number]]  (Normal) Collected:  10/07/18 1125    Lab Status:  Final result Specimen:  Blood from Arm, Right Updated:  10/07/18 1206     LACTIC ACID 1 3 mmol/L     Narrative:         Result may be elevated if tourniquet was used during collection      Troponin I [[id number]]  (Normal) Collected:  10/07/18 1125    Lab Status:  Final result Specimen:  Blood from Arm, Right Updated:  10/07/18 1202     Troponin I 0 02 ng/mL     Comprehensive metabolic panel [[id number]]  (Abnormal) Collected:  10/07/18 1125    Lab Status:  Final result Specimen: Blood from Arm, Right Updated:  10/07/18 1155     Sodium 143 mmol/L      Potassium 3 4 (L) mmol/L      Chloride 104 mmol/L      CO2 30 mmol/L      ANION GAP 9 mmol/L      BUN 31 (H) mg/dL      Creatinine 0 69 mg/dL      Glucose 118 mg/dL      Calcium 9 7 mg/dL      AST 19 U/L      ALT 30 U/L      Alkaline Phosphatase 109 U/L      Total Protein 7 5 g/dL      Albumin 3 4 (L) g/dL      Total Bilirubin 0 80 mg/dL      eGFR 83 ml/min/1 73sq m     Narrative:         National Kidney Disease Education Program recommendations are as follows:  GFR calculation is accurate only with a steady state creatinine  Chronic Kidney disease less than 60 ml/min/1 73 sq  meters  Kidney failure less than 15 ml/min/1 73 sq  meters  CBC and differential [[id number]]  (Abnormal) Collected:  10/07/18 1125    Lab Status:  Final result Specimen:  Blood from Arm, Right Updated:  10/07/18 1138     WBC 9 55 Thousand/uL      RBC 4 91 Million/uL      Hemoglobin 15 5 (H) g/dL      Hematocrit 47 7 (H) %      MCV 97 fL      MCH 31 6 pg      MCHC 32 5 g/dL      RDW 12 7 %      MPV 8 8 (L) fL      Platelets 412 Thousands/uL      nRBC 0 /100 WBCs      Neutrophils Relative 80 (H) %      Immat GRANS % 0 %      Lymphocytes Relative 10 (L) %      Monocytes Relative 8 %      Eosinophils Relative 1 %      Basophils Relative 1 %      Neutrophils Absolute 7 68 (H) Thousands/µL      Immature Grans Absolute 0 04 Thousand/uL      Lymphocytes Absolute 0 98 Thousands/µL      Monocytes Absolute 0 75 Thousand/µL      Eosinophils Absolute 0 05 Thousand/µL      Basophils Absolute 0 05 Thousands/µL                  CT chest abdomen pelvis w contrast   Final Result by Omar Lino MD (10/07 1333)   Addendum 1 of 1 by Omar Lino MD (10/07 1345)   ADDENDUM:      Purpose of addendum is to address the L1 compression fracture  Please see    report for CT reconstructions of the lumbar spine for further details  There is evidence of retropulsion        Final 1   Large stool ball within the rectum  Diverticulosis without active diverticulitis  No evidence of bowel obstruction  2   Suggestion of mild bladder wall thickening with some subtle mucosal hyperenhancement  While this may be related to underdistention, correlation with urinalysis recommended to exclude cystitis  3   Age-indeterminate compression fracture at L1 with approximately 30% height loss, appearance of which suggests likely subacute to chronic  Correlation for tenderness in this area recommended  Workstation performed: [id number]         CT recon only lumbar spine   Final Result by Christ Rossi MD (10/07 8665)      L1 compression fracture with approximately 30% height loss and retropulsion of approximately 4 5 mm resulting in focal narrowing of the spinal canal at this level  This is age indeterminate and correlation for tenderness in this area recommended  The study was marked in [facility] for immediate notification         Workstation performed: [id number]                    Procedures  ECG 12 Lead Documentation  Date/Time: 10/7/2018 11:13 AM  Performed by: Hermila Mckeon  Authorized by: MANOJ ALMENDAREZ     Indications / Diagnosis:  Failure to thrive  ECG reviewed by me, the ED Provider: yes    Patient location:  ED  Previous ECG:     Previous ECG:  Compared to current    Comparison ECG info:  2011  Interpretation:     Interpretation: non-specific    Rate:     ECG rate:  57    ECG rate assessment: bradycardic    Ectopy:     Ectopy: PAC    QRS:     QRS axis:  Normal  Conduction:     Conduction: normal    ST segments:     ST segments:  Normal           Phone Contacts  ED Phone Contact    ED Course                               MDM  Number of Diagnoses or Management Options  Adult failure to thrive: new and requires workup  Closed compression fracture of L1 lumbar vertebra [facility]): new and requires workup  Fecal impaction in rectum [facility]): new and requires workup  Urinary tract infection: new and requires workup     Amount and/or Complexity of Data Reviewed  Clinical lab tests: ordered and reviewed  Tests in the radiology section of CPT®: ordered and reviewed  Tests in the medicine section of CPT®: reviewed and ordered  Decide to obtain previous medical records or to obtain history from someone other than the patient: yes  Review and summarize past medical records: yes  Discuss the patient with other providers: yes  Independent visualization of images, tracings, or specimens: yes    Patient Progress  Patient progress: stable    CritCare Time    Disposition  Final diagnoses:   Closed compression fracture of L1 lumbar vertebra ([facility] )   Adult failure to thrive   Urinary tract infection   Fecal impaction in rectum ([facility] )     Time reflects when diagnosis was documented in both MDM as applicable and the Disposition within this note     Time User Action Codes Description Comment    10/7/2018  1:42 PM Cari Rios Add [S32 010A] Closed compression fracture of L1 lumbar vertebra ([facility] )     10/7/2018  1:42 PM Cari Rios Add [R62 7] Adult failure to thrive     10/7/2018  1:42 PM Cari Rios Add [N39 0] Urinary tract infection     10/7/2018  1:44 PM Cari Rios Add [K56 41] [facility]     10/10/2018  3:49 PM Jarrod Chou Add [K59 00] Constipation, unspecified constipation type       ED Disposition     ED Disposition Condition Comment    Admit  Case was discussed with Dr Chow and the patient's admission status was agreed to be Admission Status: inpatient status to the service of Dr Cecilia Deleon MD Documentation      Most Recent Value   Accepting Facility Name, [facility]    Transported by [employer] and Unit #)  [place] [address] Name, [facility]    Transported by [employer] and Unit #)  [facility], located within [facility]      Follow-up Information     Follow up With [employer] Details Why Contact Angelica Conner DO Family Medicine Schedule an appointment as soon as possible for a visit in 1 week(s)  [address] [facility]  [facility] [place]  [facility]      Kirsten Cruz MD Neurosurgery Schedule an appointment as soon as possible for a visit in 2 week(s)  [facility] [address]  [phone]            Discharge Medication List as of 10/10/2018  7:56 PM      START taking these medications    Details   acetaminophen (TYLENOL) 325 mg tablet Take 3 tablets (975 mg total) by mouth every 8 (eight) hours, Starting Wed 10/10/2018, No Print      bisacodyl (DULCOLAX) 10 mg suppository Insert 1 suppository (10 mg total) into the rectum daily as needed for constipation, Starting Wed 10/10/2018, Normal      cephalexin (KEFLEX) 500 mg capsule Take 1 capsule (500 mg total) by mouth every 12 (twelve) hours for 2 doses, Starting Thu 10/11/2018, Until Fri 10/12/2018, No Print      lidocaine (LIDODERM) 5 % Apply 1 patch topically daily Remove & Discard patch within 12 hours or as directed by MD, Starting Thu 10/11/2018, No Print         CONTINUE these medications which have NOT CHANGED    Details   melatonin 3 mg Take 3 mg by mouth daily at bedtime, Historical Med      aspirin 81 mg chewable tablet Chew 81 mg daily, Historical Med         STOP taking these medications       traZODone (DESYREL) 50 mg tablet Comments:   Reason for Stopping:               Outpatient Discharge Orders  Discharge Diet     Discharge Condtion:  Stabilized     Patient Aware of Diagnosis: Yes     Free of Communicable Disease:   Yes     Speech Therapy Eval and Treat     Physical Therapy Eval And Treat     Occupational Therapy Eval and Treat     Activity:  Per Rehab Recommendations     Future Lab Orders at [facility]         ED Provider  Electronically Signed by           Cristal Guo DO  10/11/18 2903

## 2018-10-07 NOTE — ASSESSMENT & PLAN NOTE
Patient has dehydration and failure to thrive  Continue with IV fluids  She is a level 3 DNR but family is undecided regarding feeding tube    Continue with ongoing discussions regarding goals of care

## 2018-10-08 PROBLEM — E87.0 HYPERNATREMIA: Status: ACTIVE | Noted: 2018-10-08

## 2018-10-08 LAB
ANION GAP SERPL CALCULATED.3IONS-SCNC: 8 MMOL/L (ref 4–13)
BASOPHILS # BLD AUTO: 0.06 THOUSANDS/ΜL (ref 0–0.1)
BASOPHILS NFR BLD AUTO: 1 % (ref 0–1)
BUN SERPL-MCNC: 29 MG/DL (ref 5–25)
CALCIUM SERPL-MCNC: 8.6 MG/DL (ref 8.3–10.1)
CHLORIDE SERPL-SCNC: 110 MMOL/L (ref 100–108)
CO2 SERPL-SCNC: 28 MMOL/L (ref 21–32)
CREAT SERPL-MCNC: 0.63 MG/DL (ref 0.6–1.3)
EOSINOPHIL # BLD AUTO: 0.27 THOUSAND/ΜL (ref 0–0.61)
EOSINOPHIL NFR BLD AUTO: 3 % (ref 0–6)
ERYTHROCYTE [DISTWIDTH] IN BLOOD BY AUTOMATED COUNT: 13 % (ref 11.6–15.1)
GFR SERPL CREATININE-BSD FRML MDRD: 86 ML/MIN/1.73SQ M
GLUCOSE SERPL-MCNC: 103 MG/DL (ref 65–140)
HCT VFR BLD AUTO: 41.1 % (ref 34.8–46.1)
HGB BLD-MCNC: 13.6 G/DL (ref 11.5–15.4)
IMM GRANULOCYTES # BLD AUTO: 0.02 THOUSAND/UL (ref 0–0.2)
IMM GRANULOCYTES NFR BLD AUTO: 0 % (ref 0–2)
LYMPHOCYTES # BLD AUTO: 1.73 THOUSANDS/ΜL (ref 0.6–4.47)
LYMPHOCYTES NFR BLD AUTO: 22 % (ref 14–44)
MCH RBC QN AUTO: 32.2 PG (ref 26.8–34.3)
MCHC RBC AUTO-ENTMCNC: 33.1 G/DL (ref 31.4–37.4)
MCV RBC AUTO: 97 FL (ref 82–98)
MONOCYTES # BLD AUTO: 0.67 THOUSAND/ΜL (ref 0.17–1.22)
MONOCYTES NFR BLD AUTO: 8 % (ref 4–12)
NEUTROPHILS # BLD AUTO: 5.25 THOUSANDS/ΜL (ref 1.85–7.62)
NEUTS SEG NFR BLD AUTO: 66 % (ref 43–75)
NRBC BLD AUTO-RTO: 0 /100 WBCS
PLATELET # BLD AUTO: 226 THOUSANDS/UL (ref 149–390)
PMV BLD AUTO: 9 FL (ref 8.9–12.7)
POTASSIUM SERPL-SCNC: 3.6 MMOL/L (ref 3.5–5.3)
RBC # BLD AUTO: 4.22 MILLION/UL (ref 3.81–5.12)
SODIUM SERPL-SCNC: 146 MMOL/L (ref 136–145)
WBC # BLD AUTO: 8 THOUSAND/UL (ref 4.31–10.16)

## 2018-10-08 PROCEDURE — G8979 MOBILITY GOAL STATUS: HCPCS

## 2018-10-08 PROCEDURE — G0008 ADMIN INFLUENZA VIRUS VAC: HCPCS | Performed by: INTERNAL MEDICINE

## 2018-10-08 PROCEDURE — G8978 MOBILITY CURRENT STATUS: HCPCS

## 2018-10-08 PROCEDURE — 97163 PT EVAL HIGH COMPLEX 45 MIN: CPT

## 2018-10-08 PROCEDURE — 90662 IIV NO PRSV INCREASED AG IM: CPT | Performed by: INTERNAL MEDICINE

## 2018-10-08 PROCEDURE — 80048 BASIC METABOLIC PNL TOTAL CA: CPT | Performed by: INTERNAL MEDICINE

## 2018-10-08 PROCEDURE — 99232 SBSQ HOSP IP/OBS MODERATE 35: CPT | Performed by: PHYSICIAN ASSISTANT

## 2018-10-08 PROCEDURE — 85025 COMPLETE CBC W/AUTO DIFF WBC: CPT | Performed by: INTERNAL MEDICINE

## 2018-10-08 RX ORDER — HYDRALAZINE HYDROCHLORIDE 20 MG/ML
5 INJECTION INTRAMUSCULAR; INTRAVENOUS EVERY 6 HOURS PRN
Status: DISCONTINUED | OUTPATIENT
Start: 2018-10-08 | End: 2018-10-10 | Stop reason: HOSPADM

## 2018-10-08 RX ORDER — SODIUM CHLORIDE 450 MG/100ML
100 INJECTION, SOLUTION INTRAVENOUS CONTINUOUS
Status: DISCONTINUED | OUTPATIENT
Start: 2018-10-08 | End: 2018-10-10

## 2018-10-08 RX ADMIN — INFLUENZA A VIRUS A/MICHIGAN/45/2015 X-275 (H1N1) ANTIGEN (FORMALDEHYDE INACTIVATED), INFLUENZA A VIRUS A/SINGAPORE/INFIMH-16-0019/2016 IVR-186 (H3N2) ANTIGEN (FORMALDEHYDE INACTIVATED), AND INFLUENZA B VIRUS B/MARYLAND/15/2016 BX-69A (A B/COLORADO/6/2017-LIKE VIRUS) ANTIGEN (FORMALDEHYDE INACTIVATED) 0.5 ML: 60; 60; 60 INJECTION, SUSPENSION INTRAMUSCULAR at 15:06

## 2018-10-08 RX ADMIN — LIDOCAINE 1 PATCH: 50 PATCH CUTANEOUS at 17:26

## 2018-10-08 RX ADMIN — SODIUM CHLORIDE 100 ML/HR: 0.45 INJECTION, SOLUTION INTRAVENOUS at 23:50

## 2018-10-08 RX ADMIN — SODIUM CHLORIDE 100 ML/HR: 0.45 INJECTION, SOLUTION INTRAVENOUS at 12:11

## 2018-10-08 RX ADMIN — POLYETHYLENE GLYCOL 3350 17 G: 17 POWDER, FOR SOLUTION ORAL at 08:54

## 2018-10-08 RX ADMIN — MELATONIN TAB 3 MG 3 MG: 3 TAB at 22:02

## 2018-10-08 RX ADMIN — ENOXAPARIN SODIUM 40 MG: 40 INJECTION SUBCUTANEOUS at 08:53

## 2018-10-08 RX ADMIN — BISACODYL 10 MG: 10 SUPPOSITORY RECTAL at 08:54

## 2018-10-08 RX ADMIN — ACETAMINOPHEN 650 MG: 325 TABLET, FILM COATED ORAL at 11:18

## 2018-10-08 RX ADMIN — CEFTRIAXONE 1000 MG: 1 INJECTION, POWDER, FOR SOLUTION INTRAMUSCULAR; INTRAVENOUS at 12:10

## 2018-10-08 RX ADMIN — ASPIRIN 81 MG 81 MG: 81 TABLET ORAL at 08:54

## 2018-10-08 NOTE — ASSESSMENT & PLAN NOTE
Patient has a history of chronic constipation and takes MiraLax and Metamucil at home  Last bowel movement was on 10/2  Continue with IV hydration    Dulcolax suppository  May need fecal disimpaction if no BM today

## 2018-10-08 NOTE — CASE MANAGEMENT
Initial Clinical Review    Admission: Date/Time/Statement: 10/7/18 @ 1346 Inpatient Written     Orders Placed This Encounter   Procedures    Inpatient Admission (expected length of stay for this patient is greater than two midnights)     Standing Status:   Standing     Number of Occurrences:   1     Order Specific Question:   Admitting Physician     Answer:   Nathan Isbell     Order Specific Question:   Level of Care     Answer:   Med Surg [16]     Order Specific Question:   Estimated length of stay     Answer:   More than 2 Midnights     Order Specific Question:   Certification     Answer:   I certify that inpatient services are medically necessary for this patient for a duration of greater than two midnights  See H&P and MD Progress Notes for additional information about the patient's course of treatment  ED: Date/Time/Mode of Arrival:   ED Arrival Information     Expected Arrival Acuity Means of Arrival Escorted By Service Admission Type    - 10/7/2018 10:21 Urgent Ambulance [facility] General Medicine Urgent    Arrival Complaint    -          Chief Complaint:   Chief Complaint   Patient presents with    Failure To Thrive      called EMS, decreased PO intake since last hospital admission, generalized pain and weakness       History of Illness: Simran Ponce is a 76 y o  female who presents with late onset advanced Alzheimer's dementia and is nonverbal at baseline  She is taking care of at home by her   History was obtained from the  and daughter at bedside  At baseline the patient had been ambulatory without assistance till last week when she had a fall  She was brought to the emergency room and after trauma workup was discharged back home  Since then patient has been  increasingly lethargic with poor oral intake and failure to thrive  Family noticed that on moving her she was wincing in pain  No fever or chills were reported    She was refusing to eat or drink and has not been taking taking any medications for last 5 days  She was brought to the emergency room because of concern for dehydration  Family also noticed that her urine was dark and foul smelling  Her last bowel movement was on Tuesday  She usually takes Metamucil daily and as needed MiraLax for chronic constipation      ED Vital Signs:   ED Triage Vitals   Temperature Pulse Respirations Blood Pressure SpO2   10/07/18 1027 10/07/18 1027 10/07/18 1027 10/07/18 1027 10/07/18 1027   98 7 °F (37 1 °C) 61 18 (!) 187/77 91 %      Temp Source Heart Rate Source Patient Position - Orthostatic VS BP Location FiO2 (%)   10/07/18 1027 10/07/18 1027 10/07/18 1215 10/07/18 1027 --   Tympanic Monitor Lying Left arm       Pain Score       10/07/18 1027       No Pain        Wt Readings from Last 1 Encounters:   10/07/18 55 4 kg (122 lb 2 2 oz)       Vital Signs (abnormal): temp 99 3, /74, 172/78 O2 sat 91% on RA    Abnormal Labs/Diagnostic Test Results:   HEMOGLOBIN 15 5*   HEMATOCRIT 47 7*   NEUTROS ABS 7 68*   NEUTROS PCT 80*   LYMPHS PCT 10*     POTASSIUM 3 4*   BUN 31*   ALBUMIN 3 4*     Leukocytes, UA Small     Nitrite, UA Positive     Protein, UA Trace     Blood, UA Moderate       RBC, UA 0-1     WBC, UA 4-10     Bacteria, UA Innumerable      CT chest abdomen pelvis w contrast  Final Result by Jacki Albarran MD (10/07 6682)   Addendum 1 of 1 by Jacki Albarran MD (10/07 8869)   ADDENDUM:       Purpose of addendum is to address the L1 compression fracture  Please see    report for CT reconstructions of the lumbar spine for further details  There is evidence of retropulsion        Final       1  Large stool ball within the rectum  Diverticulosis without active diverticulitis  No evidence of bowel obstruction            2  Suggestion of mild bladder wall thickening with some subtle mucosal hyperenhancement    While this may be related to underdistention, correlation with urinalysis recommended to exclude cystitis        3   Age-indeterminate compression fracture at L1 with approximately 30% height loss, appearance of which suggests likely subacute to chronic  Correlation for tenderness in this area recommended  CT recon only lumbar spine   L1 compression fracture with approximately 30% height loss and retropulsion of approximately 4 5 mm resulting in focal narrowing of the spinal canal at this level  This is age indeterminate and correlation for tenderness in this area recommended  ED Treatment:   Medication Administration from 10/07/2018 1021 to 10/07/2018 1428       Date/Time Order Dose Route Action     10/07/2018 1126 sodium chloride 0 9 % bolus 1,000 mL 1,000 mL Intravenous New Bag     10/07/2018 1238 iohexol (OMNIPAQUE) 350 MG/ML injection (MULTI-DOSE) 100 mL 100 mL Intravenous Given     10/07/2018 1257 ceftriaxone (ROCEPHIN) 1 g/50 mL in dextrose IVPB 1,000 mg Intravenous New Bag     10/07/2018 1416 glycerin (adult) rectal suppository 1 suppository 1 suppository Rectal Given          Past Medical/Surgical History: Active Ambulatory Problems     Diagnosis Date Noted    Late onset Alzheimer's disease without behavioral disturbance 06/05/2018     Resolved Ambulatory Problems     Diagnosis Date Noted    No Resolved Ambulatory Problems     Past Medical History:   Diagnosis Date    Acute cystitis without hematuria     Diverticulitis of colon     Hyperthyroidism     Polymyalgia rheumatica (HCC)     Vertigo        Admitting Diagnosis: Adult failure to thrive [R62 7]  Urinary tract infection [N39 0]  Failure to thrive in adult [R62 7]  Fecal impaction in rectum ([address] ) [K56 41]  Closed compression fracture of L1 lumbar vertebra ([address] ) [S32 010A]    Age/Sex: 76 y o  female    Assessment/Plan:   * Metabolic encephalopathy   Assessment & Plan     Likely secondary to dehydration and urinary tract infection with history of  severe Alzheimer's dementia    Patient nonverbal at baseline  CT head and recent prior with a to the emergency room was negative  Continue with IV hydration and antibiotics  Fall precaution  No further neurological workup for now      Late onset Alzheimer's disease without behavioral disturbance   Assessment & Plan     Patient has history late onset Alzheimer's dementia and needs assistance with  activities of daily living  She has been taking care of at home by her   Currently not on any dementia medications  Continue supportive care  Fall precautions  Patient is a level 3 DNR      Acute cystitis without hematuria   Assessment & Plan     Patient has increased confusion with underlying dementia  Urinalysis shows findings suggestive of urinary tract infection  Family noticed foul-smelling dark urine  No recent hospitalization or instrumentation  Continue with IV ceftriaxone pending urine culture results      Dehydration   Assessment & Plan     Patient has dehydration and failure to thrive  Continue with IV fluids  She is a level 3 DNR but family is undecided regarding feeding tube  Continue with ongoing discussions regarding goals of care      Closed compression fracture of L1 lumbar vertebra [facility])   Assessment & Plan     Likely sustained after the patient had a fall last week  Conservative management  Physical therapy consultation will be obtained  Lidocaine patch and scheduled Tylenol for pain control      Constipation   Assessment & Plan     Patient has a history of chronic constipation and takes MiraLax and Metamucil at home  Last bowel movement was on 10/2   did not report any nausea vomiting  Continue with IV hydration  Dulcolax suppository  May need fecal disimpaction         VTE Prophylaxis: Enoxaparin (Lovenox)  / sequential compression device     Anticipated Length of Stay:  Patient will be admitted on an Inpatient basis with an anticipated length of stay of  >2 midnights     Justification for Hospital Stay:  IV hydration and antibiotics    Admission Orders:  Dysphagia diet  Fall precautions  OOB with assist  Pt  Sequential compression device  Blood cxs pending  EKG    Scheduled Meds:   Current Facility-Administered Medications:  acetaminophen 650 mg Oral Q6H MARIYA   aspirin 81 mg Oral Daily   bisacodyl 10 mg Rectal Daily   cefTRIAXone 1,000 mg Intravenous Q24H   enoxaparin 40 mg Subcutaneous Daily   lidocaine 1 patch Topical Daily   melatonin 3 mg Oral HS   morphine injection 2 mg Intravenous Once PRN   ondansetron 4 mg Intravenous Q6H PRN   polyethylene glycol 17 g Oral Daily   sodium chloride 100 mL/hr Intravenous Continuous     Continuous Infusions:   sodium chloride 100 mL/hr Last Rate: 100 mL/hr (10/07/18 1537)     PRN Meds: morphine injection    ondansetron    Thank you,  [address] [place] [address] Utilization Review Department  Phone: [phone]; Fax [phone]  ATTENTION: Please call with any questions or concerns to [phone]  and carefully follow the prompts so that you are directed to the right person  Send all requests for admission clinical reviews, approved or denied determinations and any other requests to fax [phone]   All voicemails are confidential

## 2018-10-08 NOTE — PROGRESS NOTES
Patient unable to answer any questions due to severe Alzheimer's  Patient's  and daughter were present at time of admission and answered all questions  Patient's family requested that all four bed side rails be up at all times  Patient in bed, bed alarmed, bedside table and call bell within reach  Will continue to monitor patient

## 2018-10-08 NOTE — ASSESSMENT & PLAN NOTE
Patient has dehydration and failure to thrive exacerbated by pain  Better oral intake today  Monitor closely  Family is undecided regarding feeding tube      Continue with ongoing discussions regarding goals of care as needed

## 2018-10-08 NOTE — PHYSICAL THERAPY NOTE
PHYSICAL THERAPY EVALUATION  NAME:  Gwen Self  DATE: 10/08/18    AGE:   76 y o   Mrn:   [id number]  ADMIT DX:  Adult failure to thrive [R62 7]  Urinary tract infection [N39 0]  Failure to thrive in adult [R62 7]  Fecal impaction in rectum ([facility] ) [K56 41]  Closed compression fracture of L1 lumbar vertebra ([facility] ) [S32 010A]    Past Medical History:   Diagnosis Date    Acute cystitis without hematuria     Last assessed 12/19/15    Diverticulitis of colon     Hyperthyroidism     Polymyalgia rheumatica ([facility] )     Vertigo     last assessed 01/15/2013     Length Of Stay: 1  Performed at least 2 patient identifiers during session: ID bracelet and (pt unable to verbalize information)  PHYSICAL THERAPY EVALUATION :    10/08/18 1403   Note Type   Note type Eval only   Pain Assessment   Pain Assessment FLACC   Pain Rating: FLACC (Rest) - Face 0   Pain Rating: FLACC (Rest) - Legs 1   Pain Rating: FLACC (Rest) - Activity 1   Pain Rating: FLACC (Rest) - Cry 0   Pain Rating: FLACC (Rest) - Consolability 0   Score: FLACC (Rest) 2   Pain Rating: FLACC (Activity) - Face 0   Pain Rating: FLACC (Activity) - Legs 1   Pain Rating: FLACC (Activity) - Activity 1   Pain Rating: FLACC (Activity) - Cry 0   Pain Rating: FLACC (Activity) - Consolability 0   Score: FLACC (Activity) 2   Home Living   Type of Home (Pt unable to provide information)   Additional Comments Pt with limited command following and verbal response   Prior Function   Lives With Spouse   Receives Help From Family   Comments Per H&P:   At baseline the patient had been ambulatory without assistance till last week when she had a fall  Per prior ED vision pt was not using any DME For ambulation @ home   Restrictions/Precautions   Braces or Orthoses (no brace ordered for pt)   Other Precautions Fall Risk;Bed Alarm; Chair Alarm;Cognitive  (? visual impairment)   General   Family/Caregiver Present No   Cognition   Overall Cognitive Status Impaired Arousal/Participation Poorly responsive   Orientation Level Unable to assess   Memory Unable to assess   Following Commands Follows one step commands inconsistently   Comments Pt more responsive wiht no distractions (TV muted), 3 word responses, but diffiuclty folliwng gestures, especially to R side  RLE Assessment   RLE Assessment (Pt unable to follow MMT for strength assessment)   RLE Overall PROM   R Hip Flexion WFL   R Hip ABduction WFL   R Knee Flexion WFL   R Knee Extension WFL   R Ankle Dorsiflexion WFL   LLE Assessment   LLE Assessment (Pt unable to follow MMT for strength assessment)   LLE Overall PROM   L Hip Flexion WFL   L Hip ABduction WFL   L Knee Flexion WFL   L Knee Extension WFL   L Ankle Dorsiflexion WFL   Coordination   Movements are Fluid and Coordinated 0   Coordination and Movement Description rigid throughout   Sensation (hearing appears WFL; ? vision w/ tracking to R field)   Light Touch   RLE Light Touch (due to limited command following)   LLE Light Touch (due to limited command following)   Bed Mobility   Sit to Supine 5  Supervision   Additional items Assist x 1; Increased time required;Verbal cues  (but pt laying down w/head at foot of bed  )   Additional Comments Pt needed max A to lay down to R side due to limtied command follow via pt  Transfers   Sit to Stand (due to limited command following)   Ambulation/Elevation   Gait pattern Not tested  (due to limited participation once on egde of bed)   Balance   Static Sitting Good   Endurance Deficit   Endurance Deficit Yes   Endurance Deficit Description Pt closed eyes once head @ pillow, appeared fatigued, needed to be awakened to participate      Activity Tolerance   Activity Tolerance Patient limited by fatigue   Medical Staff Made Aware spoke to Alba Mcgrath from case management   Nurse Made Aware spoke to [address] who reported pt required A of 1 for transfers and short distance amb to chair   Assessment   Prognosis Fair   Problem List Impaired balance;Decreased endurance;Pain; Impaired vision;Decreased safety awareness; Impaired judgement;Decreased cognition;Decreased mobility; Decreased coordination   Barriers to Discharge (? amount of A that spouse is able to provide on DC)   Barriers to Discharge Comments ? stairs at home   Goals   Patient Goals none stated by pt/limited cognition/verbalization   STG Expiration Date 10/12/18   Treatment Day 0   Plan   Treatment/Interventions LE strengthening/ROM; Therapeutic exercise; Bed mobility; Equipment eval/education;Patient/family training;Cognitive reorientation; Endurance training;Continued evaluation;Spoke to nursing;Spoke to case management   PT Frequency 5x/wk   Recommendation   Recommendation Post acute IP rehab;Home with family support   Equipment Recommended Other (Comment)  (TBD, likely hand hold A)   Barthel Index   Feeding 5   Bathing 0   Grooming Score 0   Dressing Score 0   Bladder Score 0   Bowels Score 0   Toilet Use Score 0   Transfers (Bed/Chair) Score 0   Mobility (Level Surface) Score 0   Stairs Score 0   Barthel Index Score 5   (Please find full objective findings from PT assessment regarding body systems outlined above)  Assessment: Pt is a 76 y o  female seen for PT evaluation s/p admit to [facility] on 32/6/8577 w/ Metabolic encephalopathy  Order placed for PT  Prior to admission, pt lived with spouse and used no device for mobility  Upon evaluation: Pt requires fluctuating 1person assistance for bed mobilty and did not particiapte with transfers nor ambulation during [facility]    Pt's clinical presentation is currently unstable/unpredictable given the functional mobility deficits above, especially decreased endurance, gait deviations, decreased activity tolerance and decreased functional mobility tolerance, coupled with fall risks including hx of falls, impaired balance, impaired coordination, impaired judgement, decreased safety awareness and decreased cognition, and combined with medical complications of multiple readmissions and recent L1 compression fx (no bracing ordered)  Pt to benefit from continued skilled PT tx while in hospital and upon DC to address deficits as defined above and maximize level of functional mobility  From PT/mobility standpoint, recommendation at time of d/c would be inpatient rehab vs  Home PT pending progress in order to maximize pt's functional independence and consistency w/ mobility in order to facilitate return to PLOF  Recommend OT consult and continued mobility trials likely with hand hold A, limited verbal instruction, focusing on task specific goal directed mobility activities to perform eval completion         Goals: Pt will: Perform bed mobility tasks to Supervision to decrease burden of care and increase Indep in prior living environment  Assess transfers and gait to decrease burden of care and increase Indep in prior living environment  Assess steps as appropriate and set goal as indicated  The following objective measures were performed on IE: Barthel Index 5/100  Comorbidities affecting pt's physical performance at time of assessment include: limited communication, limited cognition and polymyalgia rheumatic, recent L1 compression fx  Personal factors affecting pt at time of IE include: advanced age, behavioral pattern, inability to perform IADLs, inability to perform ADLs, limited insight into impairments and recent fall(s)       Carol Ann Hendricks, PT, DPT

## 2018-10-08 NOTE — ASSESSMENT & PLAN NOTE
Likely secondary to dehydration and urinary tract infection with history of  severe Alzheimer's dementia    Clinically improving  Patient nonverbal at baseline  CT head and recent prior with a to the emergency room was negative  Continue with IV hydration and antibiotics  Fall precaution  No further neurological workup for now

## 2018-10-08 NOTE — ASSESSMENT & PLAN NOTE
Patient has increased confusion with underlying dementia  Urinalysis shows findings suggestive of urinary tract infection      Lab did not receive specimen for culture  Continue IV Ceftriaxone for 3 days total

## 2018-10-08 NOTE — PLAN OF CARE
Problem: PHYSICAL THERAPY ADULT  Goal: Performs mobility at highest level of function for planned discharge setting  See evaluation for individualized goals  Treatment/Interventions: LE strengthening/ROM, Therapeutic exercise, Bed mobility, Equipment eval/education, Patient/family training, Cognitive reorientation, Endurance training, Continued evaluation, Spoke to nursing, Spoke to case management  Equipment Recommended: Other (Comment) (TBD, likely hand hold A)       See flowsheet documentation for full assessment, interventions and recommendations  Prognosis: Fair  Problem List: Impaired balance, Decreased endurance, Pain, Impaired vision, Decreased safety awareness, Impaired judgement, Decreased cognition, Decreased mobility, Decreased coordination  Assessment: Pt is a 76 y o  female seen for PT evaluation s/p admit to [facility] on 49/3/3890 w/ Metabolic encephalopathy  Order placed for PT  Prior to admission, pt lived with spouse and used no device for mobility  Upon evaluation: Pt requires fluctuating 1person assistance for bed mobilty and did not particiapte with transfers nor ambulation during sesison  Pt's clinical presentation is currently unstable/unpredictable given the functional mobility deficits above, especially decreased endurance, gait deviations, decreased activity tolerance and decreased functional mobility tolerance, coupled with fall risks including hx of falls, impaired balance, impaired coordination, impaired judgment, decreased safety awareness and decreased cognition, and combined with medical complications of multiple readmissions and recent L1 compression fx (no bracing ordered)  Pt to benefit from continued skilled PT tx while in hospital and upon DC to address deficits as defined above and maximize level of functional mobility   From PT/mobility standpoint, recommendation at time of d/c would be inpatient rehab vs  Home PT pending progress in order to maximize pt's functional independence and consistency w/ mobility in order to facilitate return to PLOF  Recommend OT consult and continued mobility trials likely with hand hold A, limited verbal instruction, focusing on task specific goal directed mobility activities to perform eval completion       Barriers to Discharge:  (? amount of A that spouse is able to provide on DC)  Barriers to Discharge Comments: ? stairs at home  Recommendation: Post acute IP rehab, Home with family support          See flowsheet documentation for full assessment

## 2018-10-08 NOTE — ASSESSMENT & PLAN NOTE
Patient has history late onset Alzheimer's dementia and needs assistance with  activities of daily living  She has been taking care of at home by her     Currently not on any dementia medications  Continue supportive care  Fall precautions  Patient is a level 3 DNR

## 2018-10-08 NOTE — PROGRESS NOTES
Progress Note - Mela Eden 1940, 76 y o  female MRN: [id number]    Unit/Bed#: -01 Encounter: [id number]    Primary Care Provider: Kristi Jurado DO   Date and time admitted to hospital: 10/7/2018 10:22 AM        * Metabolic encephalopathy   Assessment & Plan    Likely secondary to dehydration and urinary tract infection with history of  severe Alzheimer's dementia  Clinically improving  Patient nonverbal at baseline  CT head and recent prior with a to the emergency room was negative  Continue with IV hydration and antibiotics  Fall precaution  No further neurological workup for now     Acute cystitis without hematuria   Assessment & Plan    Patient has increased confusion with underlying dementia  Urinalysis shows findings suggestive of urinary tract infection  Lab did not receive specimen for culture  Continue IV Ceftriaxone for 3 days total     Dehydration   Assessment & Plan    Patient has dehydration and failure to thrive exacerbated by pain  Better oral intake today  Monitor closely  Family is undecided regarding feeding tube  Continue with ongoing discussions regarding goals of care as needed     Hypernatremia   Assessment & Plan    Change to 1/2 NS     Constipation   Assessment & Plan    Patient has a history of chronic constipation and takes MiraLax and Metamucil at home  Last bowel movement was on 10/2  Continue with IV hydration  Dulcolax suppository  May need fecal disimpaction if no BM today     Closed compression fracture of L1 lumbar vertebra [facility])   Assessment & Plan    Likely sustained after the patient had a fall last week    Conservative management  Physical therapy consultation will be obtained  Lidocaine patch and scheduled Tylenol for pain control     Late onset Alzheimer's disease without behavioral disturbance   Assessment & Plan    Patient has history late onset Alzheimer's dementia and needs assistance with  activities of daily living  She has been taking care of at home by her   Currently not on any dementia medications  Continue supportive care  Fall precautions  Patient is a level 3 DNR       VTE Pharmacologic Prophylaxis:   Pharmacologic: Enoxaparin (Lovenox)  Mechanical VTE Prophylaxis in Place: Yes    Patient Centered Rounds: I have performed bedside rounds with nursing staff today  Discussions with Specialists or Other Care Team Provider: case management    Education and Discussions with Family / Patient:  called with update    Time Spent for Care: 30 minutes  More than 50% of total time spent on counseling and coordination of care as described above  Current Length of Stay: 1 day(s)    Current Patient Status: Inpatient   Certification Statement: The patient will continue to require additional inpatient hospital stay due to IV antibiotics    Discharge Plan: Await clinical course and PT eval     Code Status: Level 3 - DNAR and DNI      Subjective:   More alert today per nursing  Able to walk to chair with assistance  Ate 50% of breakfast this morning  Non verbal at baseline  Still without a BM  Objective:     Vitals:   Temp (24hrs), Av °F (37 2 °C), Min:98 4 °F (36 9 °C), Max:99 3 °F (37 4 °C)    HR:  [58-76] 59  Resp:  [16-18] 18  BP: (156-176)/(71-78) 172/78  SpO2:  [92 %-96 %] 96 %  Body mass index is 21 64 kg/m²  Input and Output Summary (last 24 hours): Intake/Output Summary (Last 24 hours) at 10/08/18 1046  Last data filed at 10/08/18 1031   Gross per 24 hour   Intake             1175 ml   Output              492 ml   Net              683 ml       Physical Exam:     Physical Exam   Constitutional: No distress  HENT:   Head: Normocephalic and atraumatic  Cardiovascular: Normal rate and regular rhythm  Exam reveals no friction rub  No murmur heard  Pulmonary/Chest: Effort normal and breath sounds normal  No respiratory distress  She has no wheezes  Abdominal: Soft   Bowel sounds are normal  She exhibits no distension  There is no tenderness  There is no rebound and no guarding  Musculoskeletal: She exhibits no edema  Neurological: She is alert  She exhibits normal muscle tone  Coordination normal    Non verbal   Skin: Skin is warm and dry  No rash noted  Psychiatric: Cognition and memory are impaired  She exhibits abnormal recent memory and abnormal remote memory  Nursing note and vitals reviewed  Additional Data:     Labs:      Results from last 7 days  Lab Units 10/08/18  0521   WBC Thousand/uL 8 00   HEMOGLOBIN g/dL 13 6   HEMATOCRIT % 41 1   PLATELETS Thousands/uL 226   NEUTROS PCT % 66   LYMPHS PCT % 22   MONOS PCT % 8   EOS PCT % 3       Results from last 7 days  Lab Units 10/08/18  0521 10/07/18  1125   SODIUM mmol/L 146* 143   POTASSIUM mmol/L 3 6 3 4*   CHLORIDE mmol/L 110* 104   CO2 mmol/L 28 30   BUN mg/dL 29* 31*   CREATININE mg/dL 0 63 0 69   ANION GAP mmol/L 8 9   CALCIUM mg/dL 8 6 9 7   ALBUMIN g/dL  --  3 4*   TOTAL BILIRUBIN mg/dL  --  0 80   ALK PHOS U/L  --  109   ALT U/L  --  30   AST U/L  --  19                   Results from last 7 days  Lab Units 10/07/18  1125   LACTIC ACID mmol/L 1 3           * I Have Reviewed All Lab Data Listed Above  * Additional Pertinent Lab Tests Reviewed:  [facility] Admission Reviewed    Imaging:    Imaging Reports Reviewed Today Include: CT scan  Imaging Personally Reviewed by Myself Includes:  none    Recent Cultures (last 7 days):           Last 24 Hours Medication List:     Current Facility-Administered Medications:  acetaminophen 650 mg Oral Q6H Robert Beltre MD   aspirin 81 mg Oral Daily Jt Devries MD   bisacodyl 10 mg Rectal Daily Jt Devries MD   cefTRIAXone 1,000 mg Intravenous Q24H Jt Devries MD   enoxaparin 40 mg Subcutaneous Daily Jt Devries MD   hydrALAZINE 5 mg Intravenous Q6H PRN Albin Zafar PA-C   lidocaine 1 patch Topical Daily Jt Devries MD   melatonin 3 mg Oral HS Shirin ALYCE Cunningham   morphine injection 2 mg Intravenous Once PRN Cari Rios DO   ondansetron 4 mg Intravenous Q6H PRN Margeret Bence, MD   polyethylene glycol 17 g Oral Daily Margeret Bence, MD   sodium chloride 100 mL/hr Intravenous Continuous Saw Santos PA-C        Today, Patient Was Seen By: Saw Santos PA-C    ** Please Note: Dictation voice to text software may have been used in the creation of this document   **

## 2018-10-09 PROBLEM — E87.6 HYPOKALEMIA: Status: ACTIVE | Noted: 2018-10-09

## 2018-10-09 PROBLEM — E87.0 HYPERNATREMIA: Status: RESOLVED | Noted: 2018-10-08 | Resolved: 2018-10-09

## 2018-10-09 LAB
ANION GAP SERPL CALCULATED.3IONS-SCNC: 8 MMOL/L (ref 4–13)
BUN SERPL-MCNC: 15 MG/DL (ref 5–25)
CALCIUM SERPL-MCNC: 8.8 MG/DL (ref 8.3–10.1)
CHLORIDE SERPL-SCNC: 103 MMOL/L (ref 100–108)
CO2 SERPL-SCNC: 30 MMOL/L (ref 21–32)
CREAT SERPL-MCNC: 0.55 MG/DL (ref 0.6–1.3)
ERYTHROCYTE [DISTWIDTH] IN BLOOD BY AUTOMATED COUNT: 12.8 % (ref 11.6–15.1)
GFR SERPL CREATININE-BSD FRML MDRD: 89 ML/MIN/1.73SQ M
GLUCOSE SERPL-MCNC: 101 MG/DL (ref 65–140)
HCT VFR BLD AUTO: 42.3 % (ref 34.8–46.1)
HGB BLD-MCNC: 14.1 G/DL (ref 11.5–15.4)
MCH RBC QN AUTO: 32 PG (ref 26.8–34.3)
MCHC RBC AUTO-ENTMCNC: 33.3 G/DL (ref 31.4–37.4)
MCV RBC AUTO: 96 FL (ref 82–98)
PLATELET # BLD AUTO: 242 THOUSANDS/UL (ref 149–390)
PMV BLD AUTO: 9.2 FL (ref 8.9–12.7)
POTASSIUM SERPL-SCNC: 3.1 MMOL/L (ref 3.5–5.3)
RBC # BLD AUTO: 4.41 MILLION/UL (ref 3.81–5.12)
SODIUM SERPL-SCNC: 141 MMOL/L (ref 136–145)
WBC # BLD AUTO: 8.43 THOUSAND/UL (ref 4.31–10.16)

## 2018-10-09 PROCEDURE — 99232 SBSQ HOSP IP/OBS MODERATE 35: CPT | Performed by: INTERNAL MEDICINE

## 2018-10-09 PROCEDURE — 80048 BASIC METABOLIC PNL TOTAL CA: CPT | Performed by: PHYSICIAN ASSISTANT

## 2018-10-09 PROCEDURE — 85027 COMPLETE CBC AUTOMATED: CPT | Performed by: PHYSICIAN ASSISTANT

## 2018-10-09 RX ORDER — POTASSIUM CHLORIDE 20 MEQ/1
60 TABLET, EXTENDED RELEASE ORAL ONCE
Status: COMPLETED | OUTPATIENT
Start: 2018-10-09 | End: 2018-10-09

## 2018-10-09 RX ADMIN — ASPIRIN 81 MG 81 MG: 81 TABLET ORAL at 08:53

## 2018-10-09 RX ADMIN — CEFTRIAXONE 1000 MG: 1 INJECTION, POWDER, FOR SOLUTION INTRAMUSCULAR; INTRAVENOUS at 13:03

## 2018-10-09 RX ADMIN — SODIUM CHLORIDE 100 ML/HR: 0.45 INJECTION, SOLUTION INTRAVENOUS at 22:54

## 2018-10-09 RX ADMIN — ACETAMINOPHEN 650 MG: 325 TABLET, FILM COATED ORAL at 23:05

## 2018-10-09 RX ADMIN — ACETAMINOPHEN 650 MG: 325 TABLET, FILM COATED ORAL at 07:47

## 2018-10-09 RX ADMIN — ENOXAPARIN SODIUM 40 MG: 40 INJECTION SUBCUTANEOUS at 08:53

## 2018-10-09 RX ADMIN — POLYETHYLENE GLYCOL 3350 17 G: 17 POWDER, FOR SOLUTION ORAL at 08:53

## 2018-10-09 RX ADMIN — MELATONIN TAB 3 MG 3 MG: 3 TAB at 22:54

## 2018-10-09 RX ADMIN — ACETAMINOPHEN 650 MG: 325 TABLET, FILM COATED ORAL at 13:01

## 2018-10-09 RX ADMIN — LIDOCAINE 1 PATCH: 50 PATCH CUTANEOUS at 17:18

## 2018-10-09 RX ADMIN — BISACODYL 10 MG: 10 SUPPOSITORY RECTAL at 08:53

## 2018-10-09 RX ADMIN — POTASSIUM CHLORIDE 60 MEQ: 1500 TABLET, EXTENDED RELEASE ORAL at 13:01

## 2018-10-09 RX ADMIN — ACETAMINOPHEN 650 MG: 325 TABLET, FILM COATED ORAL at 17:17

## 2018-10-09 NOTE — ASSESSMENT & PLAN NOTE
· Likely sustained after the patient had a fall last week    · Conservative management  · PT recommending rehab - choices given to case management   · Lidocaine patch and scheduled Tylenol for pain control

## 2018-10-09 NOTE — PLAN OF CARE
Problem: DISCHARGE PLANNING - CARE MANAGEMENT  Goal: Discharge to post-acute care or home with appropriate resources  INTERVENTIONS:  - Conduct assessment to determine patient/family and health care team treatment goals, and need for post-acute services based on payer coverage, community resources, and patient preferences, and barriers to discharge  - Address psychosocial, clinical, and financial barriers to discharge as identified in assessment in conjunction with the patient/family and health care team  - Arrange appropriate level of post-acute services according to patients   needs and preference and payer coverage in collaboration with the physician and health care team  - Communicate with and update the patient/family, physician, and health care team regarding progress on the discharge plan  - Arrange appropriate transportation to post-acute venues  Outcome: Progressing  LOS 2  Patient is not a readmission  Patient is a bundle  CM spoke with patient's  at bedside  Patient lives in [facility] with her  in a house  Patient's living arrangement is all on the first floor  There is 1 JESE home  Patient does not use any assistive devices  Patient is dependent for all ADLs   provides all her care  He has a non-skilled home health aide that provides assistance twice a week  Denies history of VNA and inpatient rehab services  Patient uses [address] in [place]  Patient has prescription insurance and has been able to afford her medications  Denies history of drug/alcohol abuse and mental illness history  Patient has an advance directive/living will  Copy requested from family  Patient does not work or drive  Depending on patient's plan of discharge, family with transport vs  BLS  GRECIA spoke with patient's  at bedside re:DCP  Patient's  is unsure of his plan of care for patient  He would like to speak with his children prior to making a decision   SNF list reviewed with patient's   CM educated  of VNA and HHA options   stated he has been taking care of his wife for years and will be able to continue care as long as she can ambulate  CM will f/u with  and family  CM reviewed with patient's  re:bundle program  Copy of bundle program provided to patient's   All questions/concerns answered at bedside  CM reviewed discharge planning process including the following: identifying caregivers at home, preference for d/c planning needs, [facility] to Bed program, availability of treatment team to discuss questions or concerns patient and/or family may have regarding diagnosis, plan of care, old or new medications and discharge planning   CM will continue to follow for care coordination and update assessment as necessary

## 2018-10-09 NOTE — SOCIAL WORK
CM spoke with patient's  at bedside  Patient's  updated that [facility], [facility], and [facility] have bed availability  Patient's  would like 1  [facility] 2  [facility] and 3  [facility] referrals sent  GRECIA spoke with Lilliana Chanel from [address] [place] [address]  Lilliana Chanel will reach out to patient's  for a tour of the facility  Patient's  updated with plan of care

## 2018-10-09 NOTE — ASSESSMENT & PLAN NOTE
· POA, Likely secondary to dehydration and urinary tract infection with history of  severe Alzheimer's dementia  Clinically improving   Patient nonverbal at baseline  · Will need placement at discharge   · Follow up with case management   · Will complete antibiotics tomorrow

## 2018-10-09 NOTE — ASSESSMENT & PLAN NOTE
· Patient has history late onset Alzheimer's dementia and needs assistance with  activities of daily living  She has been taking care of at home by her    Currently not on any dementia medications  · Continue supportive care  · Fall precautions

## 2018-10-09 NOTE — PROGRESS NOTES
[facility]  Progress Note - Allison Dukes 1940, 76 y o  female MRN: [id number]    Unit/Bed#: -01 Encounter: [id number]    Primary Care Provider: Tammy Ng DO   Date and time admitted to hospital: 10/7/2018 10:22 AM        * Metabolic encephalopathy   Assessment & Plan    · POA, Likely secondary to dehydration and urinary tract infection with history of  severe Alzheimer's dementia  Clinically improving  Patient nonverbal at baseline  · Will need placement at discharge   · Follow up with case management   · Will complete antibiotics tomorrow      Late onset Alzheimer's disease without behavioral disturbance   Assessment & Plan    · Patient has history late onset Alzheimer's dementia and needs assistance with  activities of daily living  She has been taking care of at home by her   Currently not on any dementia medications  · Continue supportive care  · Fall precautions     Acute cystitis without hematuria   Assessment & Plan    · POA, patient returning to baseline mental functioning as per   Urinalysis shows findings suggestive of urinary tract infection  Lab did not receive specimen for culture  · Continue IV Ceftriaxone for 3 days total - will complete tomorrow      Closed compression fracture of L1 lumbar vertebra (HCC)   Assessment & Plan    · Likely sustained after the patient had a fall last week  · Conservative management  · PT recommending rehab - choices given to case management   · Lidocaine patch and scheduled Tylenol for pain control     Hypokalemia   Assessment & Plan    · 3 1 today   · Will give 60 mEq of KCl PO - recheck BMP in AM        VTE Pharmacologic Prophylaxis:   Pharmacologic: Enoxaparin (Lovenox)  Mechanical VTE Prophylaxis in Place: Yes    Patient Centered Rounds: I have performed bedside rounds with nursing staff today      Discussions with Specialists or Other Care Team Provider: Discussed with RN, CM    Education and Discussions with Family / Patient: Discussed with patient's  and daughter     Time Spent for Care: 30 minutes  More than 50% of total time spent on counseling and coordination of care as described above  Current Length of Stay: 2 day(s)    Current Patient Status: Inpatient   Certification Statement: The patient will continue to require additional inpatient hospital stay due to IV antibiotics tomorrow, pending safe discharge plan     Discharge Plan: Hopefully 24 hours    Code Status: Level 3 - DNAR and DNI      Subjective:   Patient continues to be nonverbal which is baseline   believes that she is improving  Objective:     Vitals:   Temp (24hrs), Av 9 °F (37 2 °C), Min:98 5 °F (36 9 °C), Max:99 1 °F (37 3 °C)    Temp:  [98 5 °F (36 9 °C)-99 1 °F (37 3 °C)] 99 °F (37 2 °C)  HR:  [56-60] 58  Resp:  [18] 18  BP: (148-156)/(70-72) 154/70  SpO2:  [94 %-95 %] 95 %  Body mass index is 21 64 kg/m²  Input and Output Summary (last 24 hours): Intake/Output Summary (Last 24 hours) at 10/09/18 1150  Last data filed at 10/09/18 0912   Gross per 24 hour   Intake          3521 67 ml   Output             1368 ml   Net          2153 67 ml       Physical Exam:     Physical Exam   Constitutional: Vital signs are normal  She appears well-developed and well-nourished  She is sleeping  She is easily aroused  Non-toxic appearance  No distress  HENT:   Head: Normocephalic and atraumatic  Eyes: Pupils are equal, round, and reactive to light  Conjunctivae and EOM are normal    Neck: Neck supple  Cardiovascular: Normal rate, regular rhythm, S1 normal, S2 normal, normal heart sounds and intact distal pulses  Exam reveals no S3 and no S4  No murmur heard  Pulmonary/Chest: Effort normal and breath sounds normal  No accessory muscle usage  No respiratory distress  She has no decreased breath sounds  She has no wheezes  She has no rhonchi  She has no rales  She exhibits no tenderness  Abdominal: Soft   Bowel sounds are normal  She exhibits no distension and no mass  There is no tenderness  There is no rigidity, no rebound and no guarding  Neurological: She is easily aroused  Demented at baseline     Skin: Skin is warm and dry  Additional Data:     Labs:      Results from last 7 days  Lab Units 10/09/18  0457 10/08/18  0521   WBC Thousand/uL 8 43 8 00   HEMOGLOBIN g/dL 14 1 13 6   HEMATOCRIT % 42 3 41 1   PLATELETS Thousands/uL 242 226   NEUTROS PCT %  --  66   LYMPHS PCT %  --  22   MONOS PCT %  --  8   EOS PCT %  --  3       Results from last 7 days  Lab Units 10/09/18  0457  10/07/18  1125   SODIUM mmol/L 141  < > 143   POTASSIUM mmol/L 3 1*  < > 3 4*   CHLORIDE mmol/L 103  < > 104   CO2 mmol/L 30  < > 30   BUN mg/dL 15  < > 31*   CREATININE mg/dL 0 55*  < > 0 69   CALCIUM mg/dL 8 8  < > 9 7   ALK PHOS U/L  --   --  109   ALT U/L  --   --  30   AST U/L  --   --  19   < > = values in this interval not displayed  * I Have Reviewed All Lab Data Listed Above  * Additional Pertinent Lab Tests Reviewed: [facility] Admission Reviewed    Imaging:    Imaging Reports Reviewed Today Include: None  Imaging Personally Reviewed by Myself Includes:  None    Recent Cultures (last 7 days):       Results from last 7 days  Lab Units 10/07/18  1130 10/07/18  1125   BLOOD CULTURE  No Growth at 24 hrs  No Growth at 24 hrs         Last 24 Hours Medication List:     Current Facility-Administered Medications:  acetaminophen 650 mg Oral Q6H Zaina Macdonald MD    aspirin 81 mg Oral Daily Divine Gil MD    bisacodyl 10 mg Rectal Daily Divine Gil MD    cefTRIAXone 1,000 mg Intravenous Q24H Israel Miller PA-C Last Rate: 1,000 mg (10/08/18 1210)   enoxaparin 40 mg Subcutaneous Daily Divine Gil MD    hydrALAZINE 5 mg Intravenous Q6H PRN Jae Sotomayor PA-C    lidocaine 1 patch Topical Daily Divine Gil MD    melatonin 3 mg Oral HS Shirin Cunningham PA-C    morphine injection 2 mg Intravenous Once PRN Cari Rios,     ondansetron 4 mg Intravenous Q6H PRN Analy Olivares MD    polyethylene glycol 17 g Oral Daily Analy Olivares MD    potassium chloride 60 mEq Oral Once Giomayra Rodarte PA-C    sodium chloride 100 mL/hr Intravenous Continuous Simran MondayALYCE Last Rate: 100 mL/hr (10/08/18 2350)        Today, Patient Was Seen By: Hermilo Harvey PA-C    ** Please Note: Dictation voice to text software may have been used in the creation of this document   **

## 2018-10-09 NOTE — SOCIAL WORK
LOS 2  Patient is not a readmission  Patient is a bundle  CM spoke with patient's  at bedside  Patient lives in [place] with her  in a house  Patient's living arrangement is all on the first floor  There is 1 JESE home  Patient does not use any assistive devices  Patient is dependent for all ADLs   provides all her care  He has a non-skilled home health aide that provides assistance twice a week  Denies history of VNA and inpatient rehab services  Patient uses [facility] [address] in [place]  Patient has prescription insurance and has been able to afford her medications  Denies history of drug/alcohol abuse and mental illness history  Patient has an advance directive/living will  Copy requested from family  Patient does not work or drive  Depending on patient's plan of discharge, family with transport vs  BLS  CM spoke with patient's  at bedside re:DCP  Patient's  is unsure of his plan of care for patient  He would like to speak with his children prior to making a decision  SNF list reviewed with patient's   CM educated  of VNA and HHA options   stated he has been taking care of his wife for years and will be able to continue care as long as she can ambulate  CM will f/u with  and family  CM reviewed with patient's  re:bundle program  Copy of bundle program provided to patient's   All questions/concerns answered at bedside  CM reviewed discharge planning process including the following: identifying caregivers at home, preference for d/c planning needs, [facility] Meds to Bed program, availability of treatment team to discuss questions or concerns patient and/or family may have regarding diagnosis, plan of care, old or new medications and discharge planning   CM will continue to follow for care coordination and update assessment as necessary

## 2018-10-10 VITALS
SYSTOLIC BLOOD PRESSURE: 153 MMHG | HEART RATE: 58 BPM | TEMPERATURE: 99.3 F | WEIGHT: 122.14 LBS | BODY MASS INDEX: 21.64 KG/M2 | DIASTOLIC BLOOD PRESSURE: 68 MMHG | RESPIRATION RATE: 16 BRPM | HEIGHT: 63 IN | OXYGEN SATURATION: 94 %

## 2018-10-10 PROBLEM — E87.6 HYPOKALEMIA: Status: RESOLVED | Noted: 2018-10-09 | Resolved: 2018-10-10

## 2018-10-10 PROBLEM — G93.41 METABOLIC ENCEPHALOPATHY: Status: RESOLVED | Noted: 2018-10-07 | Resolved: 2018-10-10

## 2018-10-10 PROBLEM — E86.0 DEHYDRATION: Status: RESOLVED | Noted: 2018-10-07 | Resolved: 2018-10-10

## 2018-10-10 PROBLEM — K59.00 CONSTIPATION: Status: RESOLVED | Noted: 2018-10-07 | Resolved: 2018-10-10

## 2018-10-10 PROBLEM — N39.0 UTI (URINARY TRACT INFECTION): Status: ACTIVE | Noted: 2018-10-07

## 2018-10-10 LAB
ANION GAP SERPL CALCULATED.3IONS-SCNC: 4 MMOL/L (ref 4–13)
BUN SERPL-MCNC: 12 MG/DL (ref 5–25)
CALCIUM SERPL-MCNC: 8.8 MG/DL (ref 8.3–10.1)
CHLORIDE SERPL-SCNC: 106 MMOL/L (ref 100–108)
CO2 SERPL-SCNC: 30 MMOL/L (ref 21–32)
CREAT SERPL-MCNC: 0.66 MG/DL (ref 0.6–1.3)
GFR SERPL CREATININE-BSD FRML MDRD: 84 ML/MIN/1.73SQ M
GLUCOSE SERPL-MCNC: 104 MG/DL (ref 65–140)
POTASSIUM SERPL-SCNC: 4.1 MMOL/L (ref 3.5–5.3)
SODIUM SERPL-SCNC: 140 MMOL/L (ref 136–145)

## 2018-10-10 PROCEDURE — 80048 BASIC METABOLIC PNL TOTAL CA: CPT | Performed by: PHYSICIAN ASSISTANT

## 2018-10-10 PROCEDURE — 99239 HOSP IP/OBS DSCHRG MGMT >30: CPT | Performed by: PHYSICIAN ASSISTANT

## 2018-10-10 RX ORDER — CEPHALEXIN 500 MG/1
500 CAPSULE ORAL EVERY 12 HOURS SCHEDULED
Qty: 2 CAPSULE | Refills: 0
Start: 2018-10-11 | End: 2018-10-12

## 2018-10-10 RX ORDER — BISACODYL 10 MG
10 SUPPOSITORY, RECTAL RECTAL DAILY PRN
Qty: 12 SUPPOSITORY | Refills: 0 | Status: SHIPPED | OUTPATIENT
Start: 2018-10-10

## 2018-10-10 RX ORDER — CEPHALEXIN 500 MG/1
500 CAPSULE ORAL EVERY 12 HOURS SCHEDULED
Status: DISCONTINUED | OUTPATIENT
Start: 2018-10-11 | End: 2018-10-10 | Stop reason: HOSPADM

## 2018-10-10 RX ORDER — ACETAMINOPHEN 325 MG/1
975 TABLET ORAL EVERY 8 HOURS
Qty: 30 TABLET | Refills: 0
Start: 2018-10-10

## 2018-10-10 RX ORDER — LIDOCAINE 50 MG/G
1 PATCH TOPICAL DAILY
Qty: 30 PATCH | Refills: 0
Start: 2018-10-11 | End: 2019-05-03

## 2018-10-10 RX ORDER — ACETAMINOPHEN 325 MG/1
975 TABLET ORAL EVERY 8 HOURS SCHEDULED
Status: DISCONTINUED | OUTPATIENT
Start: 2018-10-10 | End: 2018-10-10 | Stop reason: HOSPADM

## 2018-10-10 RX ADMIN — POLYETHYLENE GLYCOL 3350 17 G: 17 POWDER, FOR SOLUTION ORAL at 08:13

## 2018-10-10 RX ADMIN — ASPIRIN 81 MG 81 MG: 81 TABLET ORAL at 08:13

## 2018-10-10 RX ADMIN — ENOXAPARIN SODIUM 40 MG: 40 INJECTION SUBCUTANEOUS at 08:13

## 2018-10-10 RX ADMIN — CEFTRIAXONE 1000 MG: 1 INJECTION, POWDER, FOR SOLUTION INTRAMUSCULAR; INTRAVENOUS at 12:36

## 2018-10-10 RX ADMIN — BISACODYL 10 MG: 10 SUPPOSITORY RECTAL at 08:13

## 2018-10-10 RX ADMIN — SODIUM CHLORIDE 100 ML/HR: 0.45 INJECTION, SOLUTION INTRAVENOUS at 08:20

## 2018-10-10 RX ADMIN — LIDOCAINE 1 PATCH: 50 PATCH CUTANEOUS at 14:39

## 2018-10-10 NOTE — ASSESSMENT & PLAN NOTE
· Patient had dehydration on admission (shown clinically and with hemoconcentration)  · % of 2 meals/day past 2 days

## 2018-10-10 NOTE — ASSESSMENT & PLAN NOTE
· POA, Likely secondary to dehydration and probably urinary tract infection with history of  severe Alzheimer's dementia  Clinically improving  Patient minimally verbal at baseline  · According to discussion with patients , patient is back to baseline and "hasn't been this good in some time"  · Discharge to [facility]  · S/P 4 days IV ceftriaxone  Will treat with keflex BID x 1 additional day (tomorrow) to complete 5 day course     · Encourage hydration on discharge

## 2018-10-10 NOTE — ASSESSMENT & PLAN NOTE
· POA, patient returned to baseline mental functioning as per   · Urinalysis shows findings suggestive of urinary tract infection  Lab did not receive specimen for culture given WBC not > 10 (were 4-10)  · S/P 4 days IV ceftriaxone  Keflex BID x 2 doses to complete 5 day course     · No culture was obtained but given clinical improvement suspect susceptible to cephalosporins   · CT with changes suspicious for cystitis

## 2018-10-10 NOTE — SOCIAL WORK
CM spoke with patient's  at bedside  [facility] does not have a bed available at this time  [facility] has a bed available and will be by around 11 am to speak with family

## 2018-10-10 NOTE — SOCIAL WORK
CM spoke with Harper Levi from [facility]  Patient's  is agreeable to facility for STR  CM spoke with patient's  over the phone  Patient's  is agreeable to [facility] for inpatient rehab at discharge  Patient's  has concerns re: patient's bladder infection  CM spoke to the PA to address those concerns  Patient's  would like transport arranged at time of discharge

## 2018-10-10 NOTE — DISCHARGE SUMMARY
[facility]  [facility] 1940, 76 [facility]  female MRN: [id number]  Unit/Bed#: -01 Encounter: [id number]  Primary Care Provider: Flor Meng DO   Date and time admitted to hospital: 10/7/2018 50:04 AM    * Metabolic encephalopathy   Assessment & Plan    · POA, Likely secondary to dehydration and probably urinary tract infection with history of  severe Alzheimer's dementia  Clinically improving  Patient minimally verbal at baseline  · According to discussion with patients , patient is back to baseline and "hasn't been this good in some time"  · Discharge to [facility]  · S/P 4 days IV ceftriaxone  Will treat with keflex BID x 1 additional day (tomorrow) to complete 5 day course  · Encourage hydration on discharge     Suspected UTI (urinary tract infection)   Assessment & Plan    · POA, patient returned to baseline mental functioning as per   · Urinalysis shows findings suggestive of urinary tract infection  Lab did not receive specimen for culture given WBC not > 10 (were 4-10)  · S/P 4 days IV ceftriaxone  Keflex BID x 2 doses to complete 5 day course  · No culture was obtained but given clinical improvement suspect susceptible to cephalosporins   · CT with changes suspicious for cystitis      Late onset Alzheimer's disease without behavioral disturbance   Assessment & Plan    · Patient has history late onset Alzheimer's dementia and needs assistance with  activities of daily living     · Currently not on any dementia medications  · Discharge to SNF for further management     Dehydration   Assessment & Plan    · Patient had dehydration on admission (shown clinically and with hemoconcentration)  · % of 2 meals/day past 2 days     Constipationresolved as of 10/10/2018   Assessment & Plan    · BM this AM   · Supportive care     Closed compression fracture of L1 lumbar vertebra (HCC)   Assessment & Plan    · CT: L1 compression fracture with approximately 30% height loss and retropulsion of approximately 4 5 mm resulting in focal narrowing of the spinal canal at this level  This is age indeterminate and correlation for tenderness in this area recommended  · PT recommending rehab - choices given to case management   · Lidocaine patch and scheduled Tylenol for pain control     Hypokalemiaresolved as of 10/10/2018   Assessment & Plan    · Resolved and now 4 1         Discharging Physician / Practitioner: Brina Teixeira PA-C  PCP: Noe Mccormack DO  Admission Date:   Admission Orders     Ordered        10/07/18 1346  Inpatient Admission (expected length of stay for this patient is greater than two midnights)  Once             Discharge Date: 10/10/18    Disposition:      Short Term Rehab or SNF at [place] [address] (see below)    For Discharges to [facility]:   · [address] [place] [address] Physician    Reason for Admission: Confusion    Discharge Diagnoses:     Please see assessment and plan section above for further details regarding discharge diagnoses  Resolved Problems  Date Reviewed: 10/10/2018          Resolved    Constipation 10/10/2018     Resolved by  Brina Teixeira PA-C    Hypernatremia 10/9/2018     Resolved by  Pricilla Hunter PA-C    Hypokalemia 10/10/2018     Resolved by  Brina Teixeira PA-C          Consultations During Hospital Stay:  · None    Procedures Performed:     · CT: L1 compression fracture with approximately 30% height loss and retropulsion of approximately 4 5 mm resulting in focal narrowing of the spinal canal at this level  This is age indeterminate and correlation for tenderness in this area recommended  · CT: Large stool ball within the rectum  Diverticulosis without active diverticulitis  No evidence of bowel obstruction  Suggestion of mild bladder wall thickening with some subtle mucosal hyperenhancement    While this may be related to underdistention, correlation with urinalysis recommended to exclude cystitis  Age-indeterminate compression fracture at L1 with approximately 30% height loss, appearance of which suggests likely subacute to chronic  Correlation for tenderness in this area recommended  Medication Adjustments and Discharge Medications:  · Summary of Medication Adjustments made as a result of this hospitalization: started on tylenol and lidoderm patch  · Medication Dosing Tapers - Please refer to Discharge Medication List for details on any medication dosing tapers (if applicable to patient)  · Medications being temporarily held (include recommended restart time): none  · Discharge Medication List: See after visit summary for reconciled discharge medications  Wound Care Recommendations:  When applicable, please see wound care section of After Visit Summary  Diet Recommendations at Discharge:  Diet -        Diet Orders            Start     Ordered    10/07/18 1654  Room Service  Once     Question:  Type of Service  Answer:  Room Service - Appropriate with Assistance    10/07/18 1653    10/07/18 1439  Diet Dysphagia/Modified Consistency; Dysphagia 2-Mechanical Soft; Thin Liquid  Diet effective now     Comments:  Once patient fully awake   Question Answer Comment   Diet Type Dysphagia/Modified Consistency    Dysphagia/Modified Consistency Dysphagia 2-Mechanical Soft    Liquid Modifier Thin Liquid    RD to adjust diet per protocol?  Yes        10/07/18 1438          Instructions for any Catheters / Lines Present at Discharge (including removal date, if applicable): N/A    Significant Findings / Test Results:       · Encephalopathy, present on admission, resolved  · Dehydration, present on admission, resolved  · Probable urinary tract infection, present on admission, treated with IV antibiotics  · L1 compression fracture with retropulsion    Incidental Findings:   · L1 compression fracture with retropulsion    Test Results Pending at Discharge (will require follow up):   · none Outpatient Tests Requested:  · Follow up with PCP  · Follow up with neurosurgery  · MRI and follow-up with Neurosurgery of any change in back pain, neurologic changes    Complications:  none    Hospital Course:     Aj Lawrence is a 76 y o  female patient who originally presented to the hospital on 10/7/2018 due to confusion  Patient has a past medical history of Alzheimer's, insomnia and constipation  She was noted to be seen in the ER initially on 10/3/2018  Prior to that she was noted to be ambulatory at baseline  She was noted to fall, developed pain however she was unable to identify where the pain is  At baseline, she has severe dementia and is minimally verbal   She was noted to be seen in the ER, had CT of the head, chest x-ray and x-ray of the pelvis performed without abnormality and was discharged home  The patient returned to the ER with her  given that she appeared to be uncomfortable at home  Appear to be in pain but was unable to vocalize where this was  She was noted to be, more and more confused, eat less at home and become more lethargic  The patient was seen in the ER, had abnormal UA consistent with potential urinary tract infection however this was not sent for culture  She was noted to have a CT of the chest abdomen and pelvis as well as a reconstitution of lumbar spine which was noted to show L1 compression fracture with retropulsion  The patient had no tenderness over the spine on exam and did not grimace or appear uncomfortable  She was noted to be moving her bilateral lower extremities equally  It was felt that her metabolic encephalopathy was likely related to dehydration coupled with potential urinary tract infection and she was treated with 4 days of IV antibiotics  She will be discharged with an additional 1 day of oral Keflex to complete a 5 day course of antibiotics    Her CT scan was consistent with potential cystitis and she had an abnormal UA however culture was not reflex  They were unable to add this on however given improvement in symptoms I suspect that this was sensitive to cephalosporins  She was noted to have decreased oral intake on admission however then the patient's oral intake began to increase and she was eating approximately % of her meals twice daily  Her  reports that she was back to baseline mental status  The patient will be discharged to rehab  Of note, in regards to her L1 compression fracture and retropulsion, this patient's case was discussed with Neurosurgery and given her lack of back pain or current symptoms, and breathing was not recommended  I discussed with the patient's  who was in agreement that he would not want to brace the patient  He understands the risks of potential further retropulsion and cord compression  She should not lift greater than 10 lb nor should she twist     Condition at Discharge: stable     Discharge Day Visit / Exam:     Subjective:  Patient minimally verbal   Denies any pain  Answers a few questions in yes or no fashion  Vitals: Blood Pressure: 160/67 (10/10/18 0700)  Pulse: 80 (10/10/18 0700)  Temperature: 98 1 °F (36 7 °C) (10/10/18 0700)  Temp Source: Oral (10/10/18 0700)  Respirations: 16 (10/10/18 0700)  Height: 5' 3" (160 cm) (10/07/18 1500)  Weight - Scale: 55 4 kg (122 lb 2 2 oz) (10/07/18 1500)  SpO2: 94 % (10/10/18 0700)  Exam:   Physical Exam   Constitutional: She appears well-developed and well-nourished  HENT:   Head: Normocephalic and atraumatic  Eyes: Conjunctivae are normal    Cardiovascular: Normal rate, regular rhythm, normal heart sounds and intact distal pulses  No murmur heard  Pulmonary/Chest: Effort normal and breath sounds normal  No accessory muscle usage  No respiratory distress  She has no wheezes  She has no rales  Abdominal: Soft  Bowel sounds are normal  She exhibits no distension  There is no tenderness   There is no rigidity, no rebound and no guarding  Musculoskeletal: She exhibits no edema  Neurological:   Eyes closed when I entered the room however opens them to light touch, states yes or no at times  Intermittently follows commands  Speech, although limited is without slurring  Face without facial droop  Bilateral lower extremity negative Babinski  Moving bilateral lower extremities equally  Skin: Skin is warm and dry  No rash noted  No erythema  Psychiatric: She has a normal mood and affect  Her behavior is normal    Nursing note and vitals reviewed  Discussion with Family: son via phone    Goals of Care Discussions:  · Code Status at Discharge: Level 3 - DNAR and DNI  · Were there any Goals of Care Discussions during Hospitalization?: Yes  · Results of any General Goals of Care Discussions: Initially began discussion about potential tube feeds but then intake increased   · POLST Completed: No   · If POLST Completed, Summary of POLST Agreement Provided Here: N/A   · OK to Rehospitalize if Needed? Yes    Discharge instructions/Information to patient and family:   See after visit summary section titled Discharge Instructions for information provided to patient and family  Planned Readmission: no      Discharge Statement:  I spent 45 minutes discharging the patient  This time was spent on the day of discharge  I had direct contact with the patient on the day of discharge  Greater than 50% of the total time was spent examining patient, answering all patient questions, arranging and discussing plan of care with patient as well as directly providing post-discharge instructions  Additional time then spent on discharge activities      ** Please Note: This note has been constructed using a voice recognition system **

## 2018-10-10 NOTE — DISCHARGE INSTRUCTIONS
· No twisting  No lifting > 10 lbs  · L1 compression noted with retropulsion  If develops back pain, neurological symptoms would need MRI and neurosurgery evaluation

## 2018-10-10 NOTE — PLAN OF CARE
Problem: DISCHARGE PLANNING - CARE MANAGEMENT  Goal: Discharge to post-acute care or home with appropriate resources  INTERVENTIONS:  - Conduct assessment to determine patient/family and health care team treatment goals, and need for post-acute services based on payer coverage, community resources, and patient preferences, and barriers to discharge  - Address psychosocial, clinical, and financial barriers to discharge as identified in assessment in conjunction with the patient/family and health care team  - Arrange appropriate level of post-acute services according to patients   needs and preference and payer coverage in collaboration with the physician and health care team  - Communicate with and update the patient/family, physician, and health care team regarding progress on the discharge plan  - Arrange appropriate transportation to post-acute venues   Outcome: Completed Date Met: 10/10/18  SLECARROLL called back with a  time of 1900 with [facility] LATHA PAIGE spoke with patient's  at bedside   is agreeable to plan of care   is agreeable to discharge at 1900 to [facility]  [facility], nursing, and receiving facility updated with plan of care

## 2018-10-10 NOTE — SOCIAL WORK
PAT called back with a  time of 1900 with [facility] LATHA PAIGE spoke with patient's  at bedside   is agreeable to plan of care   is agreeable to discharge at 1900 to [facility]  [facility], nursing, and receiving facility updated with plan of care

## 2018-10-10 NOTE — ASSESSMENT & PLAN NOTE
· Patient has history late onset Alzheimer's dementia and needs assistance with  activities of daily living     · Currently not on any dementia medications  · Discharge to SNF for further management

## 2018-10-10 NOTE — SOCIAL WORK
Per [facility], patient is medically stable for discharge  CM called [facility] to setup transport  Spoke with Denia Matamoros  Waiting for callback

## 2018-10-11 ENCOUNTER — TRANSCRIBE ORDERS (OUTPATIENT)
Dept: NEUROSURGERY | Facility: CLINIC | Age: 78
End: 2018-10-11

## 2018-10-11 DIAGNOSIS — S32.010S CLOSED COMPRESSION FRACTURE OF L1 LUMBAR VERTEBRA, SEQUELA: Primary | ICD-10-CM

## 2018-10-12 ENCOUNTER — TRANSITIONAL CARE MANAGEMENT (OUTPATIENT)
Dept: FAMILY MEDICINE CLINIC | Facility: CLINIC | Age: 78
End: 2018-10-12

## 2018-10-12 LAB
BACTERIA BLD CULT: NORMAL
BACTERIA BLD CULT: NORMAL

## 2018-10-23 ENCOUNTER — TRANSITIONAL CARE MANAGEMENT (OUTPATIENT)
Dept: FAMILY MEDICINE CLINIC | Facility: CLINIC | Age: 78
End: 2018-10-23

## 2018-10-26 ENCOUNTER — PATIENT OUTREACH (OUTPATIENT)
Dept: FAMILY MEDICINE CLINIC | Facility: CLINIC | Age: 78
End: 2018-10-26

## 2018-10-26 NOTE — PROGRESS NOTES
Outpatient Care Management Note:    Re: introduced myself and outpatient care management services   declines to participate at this time  Provided my name and contact information if future assistance required

## 2018-10-31 ENCOUNTER — OFFICE VISIT (OUTPATIENT)
Dept: NEUROSURGERY | Facility: CLINIC | Age: 78
End: 2018-10-31
Payer: MEDICARE

## 2018-10-31 VITALS
DIASTOLIC BLOOD PRESSURE: 70 MMHG | TEMPERATURE: 98.3 F | RESPIRATION RATE: 16 BRPM | HEIGHT: 63 IN | BODY MASS INDEX: 21.64 KG/M2 | HEART RATE: 78 BPM | SYSTOLIC BLOOD PRESSURE: 134 MMHG

## 2018-10-31 DIAGNOSIS — S32.010S CLOSED COMPRESSION FRACTURE OF L1 LUMBAR VERTEBRA, SEQUELA: ICD-10-CM

## 2018-10-31 PROCEDURE — 99203 OFFICE O/P NEW LOW 30 MIN: CPT | Performed by: PHYSICIAN ASSISTANT

## 2018-10-31 RX ORDER — UREA 10 %
LOTION (ML) TOPICAL
COMMUNITY
End: 2019-05-03 | Stop reason: SDUPTHER

## 2018-10-31 NOTE — PATIENT INSTRUCTIONS
As discussed proceed for plain lumbar spine x-ray today this may be performed at [address]  Restrictions are as discussed, lifting pushing pulling no more than 10 lb  Avoid frequent bending  Ambulation as tolerated with supervision is encouraged  TLSO brace should be worn when out of bed at greater than 45°, is not necessary to wear the brace in bed  Continue with her current pain management regimen  Further follow-up is planned in approximately 4 weeks, Dr [facility])    Repeat lumbar spine study a few days prior to follow-up visit  Meet with her family doctor to discuss osteoporosis evaluation and management

## 2018-10-31 NOTE — LETTER
October 31, 2018     Ryan Ceron, [address] [place] [id number]    Patient: Elizabet Talamantes   YOB: 1940   Date of Visit: 10/31/2018       Dear Dr Perry Overall: Thank you for referring Haleighchayito Collin to me for evaluation  Below are my notes for this consultation  If you have questions, please do not hesitate to call me  I look forward to following your patient along with you  Sincerely,        Orestes Sharpe PA-C        CC: Aron Barba, DO Orestes Sharpe PA-C  10/31/2018  2:15 PM  Sign at close encounter  Assessment/Plan:    Very pleasant 68-year-old female, accompanied by her , presents for history of fall with L1 compression fracture, approximately 3 weeks ago  She is moderately demented and does not respond to questions but will follow instructions  Her  reports she has minimal complaint of pain, only with certain twisting type activities, she has not been placed in a brace at this point  He has been giving her Tylenol and using salonpas on a daily basis for pain control  She had been at [place] [id number] for rehab, but is now at home with her   There is no evidence of bowel or bladder incontinence, perineal anesthesia, motor sensory difficulties in the lower extremities  She has a CT lumbar spine 10/7/18 which is carefully reviewed and reveals the L1 compression fracture with at least 50% decrease in height  There is no recent imaging for review  A new study is ordered today  Upon review of the study her vertebrae appear extremely washed out consistent with the diagnosis of osteoporosis  A lumbar spine study is ordered to today as well to assess for stability      On examination today; motor examination of the lower extremities is 5 x 5 for power, reflexes are intact, there is some tenderness with percussion over the lumbar spine, heart is regular rate and rhythm, lungs are clear in all fields, sensation of the lower extremities is intact  She is fitted with a TLSO brace today and instructions for its use are reviewed with her , specifically that she needs to wear it when she has out of bed and greater than 45°, it is not necessary when she is recumbent  She is to have restricted activities, lifting, pushing or pulling no greater than 10 lb,  may ambulate as tolerated, supervised ambulation with her  is encouraged  Further follow-up is planned in approximately 4 weeks with repeat lumbar spine study a few days prior to visit  I have suggested that they meet with her primary care provider to discuss consideration for DEXA scan, and osteoporosis management, as is clinically appropriate  These findings, impressions and recommendations are reviewed in great detail with the patient and particularly her , he expressed understanding and agreement, his questions were answered completely and to his satisfaction  Follow up has been scheduled  Diagnoses and all orders for this visit:    Closed compression fracture of L1 lumbar vertebra, sequela  -     Ambulatory referral to Neurosurgery  -     X-ray lumbar spine 2 or 3 views; Future  -     Tlso Back Brace  -     X-ray lumbar spine 2 or 3 views; Future          Return in about 4 weeks (around 11/28/2018) for Review lumbar spine x-ray       Subjective:      Patient ID: Archie Staley is a [de-identified] y o  female  HPI    The following portions of the patient's history were reviewed and updated as appropriate: problem list     Review of Systems   Constitutional: Negative  HENT: Negative  Eyes: Negative  Respiratory: Negative  Cardiovascular: Negative  Gastrointestinal: Negative  Endocrine: Negative  Genitourinary: Negative  Musculoskeletal: Positive for back pain  Skin: Negative  Allergic/Immunologic: Negative  Neurological: Negative  Hematological: Bruises/bleeds easily (On Aspirin)     Psychiatric/Behavioral: Negative  Objective:    Physical Exam   Constitutional: She appears well-developed and well-nourished  HENT:   Head: Normocephalic and atraumatic  Eyes: Pupils are equal, round, and reactive to light  EOM are normal    Cardiovascular: Normal rate and regular rhythm  Pulmonary/Chest: Effort normal and breath sounds normal    Neurological: She is alert  Skin: Skin is warm and dry  Neurologic Exam     Cranial Nerves     CN III, IV, VI   Pupils are equal, round, and reactive to light  Extraocular motions are normal           CT LUMBAR SPINE   10/7/18     INDICATION:   fall,      COMPARISON:  No prior examinations are available for comparison      TECHNIQUE: Axial CT examination of the lumbar spine was obtained utilizing reconstructed images from CT of the chest, abdomen and pelvis performed the same day  Images were reformatted in the sagittal and coronal planes      This examination, like all CT scans performed in the [facility], was performed utilizing techniques to minimize radiation dose exposure, including the use of iterative reconstruction and automated exposure control        FINDINGS:     ALIGNMENT: Normal alignment of lumbar vertebral bodies  No subluxation      VERTEBRAL BODIES: There is a compression fracture at L1 with approximately 30% height loss  There is slight retropulsion of fracture fragment approximately 4 5 mm with focal narrowing of the spinal canal at this level  Remaining vertebral heights are   normal      DEGENERATIVE CHANGES: There is mild degenerative disc disease at L3-L4 and T12-L1      PREVERTEBRAL AND PARASPINAL SOFT TISSUES: Normal   No hematoma      IMPRESSION:     L1 compression fracture with approximately 30% height loss and retropulsion of approximately 4 5 mm resulting in focal narrowing of the spinal canal at this level  This is age indeterminate and correlation for tenderness in this area recommended

## 2018-10-31 NOTE — PROGRESS NOTES
Assessment/Plan:    Very pleasant 56-year-old female, accompanied by her , presents for history of fall with L1 compression fracture, approximately 3 weeks ago  She is moderately demented and does not respond to questions but will follow instructions  Her  reports she has minimal complaint of pain, only with certain twisting type activities, she has not been placed in a brace at this point  He has been giving her Tylenol and using salonpas on a daily basis for pain control  She had been at [facility] for rehab, but is now at home with her   There is no evidence of bowel or bladder incontinence, perineal anesthesia, motor sensory difficulties in the lower extremities  She has a CT lumbar spine 10/7/18 which is carefully reviewed and reveals the L1 compression fracture with at least 50% decrease in height  There is no recent imaging for review  A new study is ordered today  Upon review of the study her vertebrae appear extremely washed out consistent with the diagnosis of osteoporosis  A lumbar spine study is ordered to today as well to assess for stability  On examination today; motor examination of the lower extremities is 5 x 5 for power, reflexes are intact, there is some tenderness with percussion over the lumbar spine, heart is regular rate and rhythm, lungs are clear in all fields, sensation of the lower extremities is intact  She is fitted with a TLSO brace today and instructions for its use are reviewed with her , specifically that she needs to wear it when she has out of bed and greater than 45°, it is not necessary when she is recumbent  She is to have restricted activities, lifting, pushing or pulling no greater than 10 lb,  may ambulate as tolerated, supervised ambulation with her  is encouraged  Further follow-up is planned in approximately 4 weeks with repeat lumbar spine study a few days prior to visit      I have suggested that they meet with her primary care provider to discuss consideration for DEXA scan, and osteoporosis management, as is clinically appropriate  These findings, impressions and recommendations are reviewed in great detail with the patient and particularly her , he expressed understanding and agreement, his questions were answered completely and to his satisfaction  Follow up has been scheduled  Diagnoses and all orders for this visit:    Closed compression fracture of L1 lumbar vertebra, sequela  -     Ambulatory referral to Neurosurgery  -     X-ray lumbar spine 2 or 3 views; Future  -     Tlso Back Brace  -     X-ray lumbar spine 2 or 3 views; Future          Return in about 4 weeks (around 11/28/2018) for Review lumbar spine x-ray       Subjective:      Patient ID: Fam Jameson is a [de-identified] y o  female  HPI    The following portions of the patient's history were reviewed and updated as appropriate: problem list     Review of Systems   Constitutional: Negative  HENT: Negative  Eyes: Negative  Respiratory: Negative  Cardiovascular: Negative  Gastrointestinal: Negative  Endocrine: Negative  Genitourinary: Negative  Musculoskeletal: Positive for back pain  Skin: Negative  Allergic/Immunologic: Negative  Neurological: Negative  Hematological: Bruises/bleeds easily (On Aspirin)  Psychiatric/Behavioral: Negative  Objective:    Physical Exam   Constitutional: She appears well-developed and well-nourished  HENT:   Head: Normocephalic and atraumatic  Eyes: Pupils are equal, round, and reactive to light  EOM are normal    Cardiovascular: Normal rate and regular rhythm  Pulmonary/Chest: Effort normal and breath sounds normal    Neurological: She is alert  Skin: Skin is warm and dry  Neurologic Exam     Cranial Nerves     CN III, IV, VI   Pupils are equal, round, and reactive to light    Extraocular motions are normal           CT LUMBAR SPINE 10/7/18     INDICATION:   fall,      COMPARISON:  No prior examinations are available for comparison      TECHNIQUE: Axial CT examination of the lumbar spine was obtained utilizing reconstructed images from CT of the chest, abdomen and pelvis performed the same day  Images were reformatted in the sagittal and coronal planes      This examination, like all CT scans performed in the [facility], was performed utilizing techniques to minimize radiation dose exposure, including the use of iterative reconstruction and automated exposure control        FINDINGS:     ALIGNMENT: Normal alignment of lumbar vertebral bodies  No subluxation      VERTEBRAL BODIES: There is a compression fracture at L1 with approximately 30% height loss  There is slight retropulsion of fracture fragment approximately 4 5 mm with focal narrowing of the spinal canal at this level  Remaining vertebral heights are   normal      DEGENERATIVE CHANGES: There is mild degenerative disc disease at L3-L4 and T12-L1      PREVERTEBRAL AND PARASPINAL SOFT TISSUES: Normal   No hematoma      IMPRESSION:     L1 compression fracture with approximately 30% height loss and retropulsion of approximately 4 5 mm resulting in focal narrowing of the spinal canal at this level  This is age indeterminate and correlation for tenderness in this area recommended

## 2018-11-02 ENCOUNTER — HOSPITAL ENCOUNTER (OUTPATIENT)
Dept: RADIOLOGY | Facility: HOSPITAL | Age: 78
Discharge: HOME | End: 2018-11-02
Payer: MEDICARE

## 2018-11-02 ENCOUNTER — OFFICE VISIT (OUTPATIENT)
Dept: FAMILY MEDICINE CLINIC | Facility: CLINIC | Age: 78
End: 2018-11-02
Payer: MEDICARE

## 2018-11-02 ENCOUNTER — PATIENT OUTREACH (OUTPATIENT)
Dept: FAMILY MEDICINE CLINIC | Facility: CLINIC | Age: 78
End: 2018-11-02

## 2018-11-02 ENCOUNTER — TRANSCRIBE ORDERS (OUTPATIENT)
Dept: ADMINISTRATIVE | Facility: HOSPITAL | Age: 78
End: 2018-11-02

## 2018-11-02 VITALS
RESPIRATION RATE: 16 BRPM | DIASTOLIC BLOOD PRESSURE: 76 MMHG | SYSTOLIC BLOOD PRESSURE: 120 MMHG | HEART RATE: 64 BPM | TEMPERATURE: 98.4 F | OXYGEN SATURATION: 96 %

## 2018-11-02 DIAGNOSIS — G30.1 LATE ONSET ALZHEIMER'S DISEASE WITHOUT BEHAVIORAL DISTURBANCE (HCC): Chronic | ICD-10-CM

## 2018-11-02 DIAGNOSIS — F02.80 LATE ONSET ALZHEIMER'S DISEASE WITHOUT BEHAVIORAL DISTURBANCE (HCC): Chronic | ICD-10-CM

## 2018-11-02 DIAGNOSIS — M85.80 OSTEOPENIA, UNSPECIFIED LOCATION: ICD-10-CM

## 2018-11-02 DIAGNOSIS — S32.010A CLOSED COMPRESSION FRACTURE OF FIRST LUMBAR VERTEBRA, INITIAL ENCOUNTER: Primary | Chronic | ICD-10-CM

## 2018-11-02 DIAGNOSIS — E87.1 HYPONATREMIA: ICD-10-CM

## 2018-11-02 DIAGNOSIS — S32.010S CLOSED COMPRESSION FRACTURE OF L1 LUMBAR VERTEBRA, SEQUELA: ICD-10-CM

## 2018-11-02 DIAGNOSIS — N30.00 ACUTE CYSTITIS WITHOUT HEMATURIA: ICD-10-CM

## 2018-11-02 DIAGNOSIS — R53.83 FATIGUE, UNSPECIFIED TYPE: ICD-10-CM

## 2018-11-02 PROCEDURE — 99495 TRANSJ CARE MGMT MOD F2F 14D: CPT | Performed by: FAMILY MEDICINE

## 2018-11-02 PROCEDURE — 72100 X-RAY EXAM L-S SPINE 2/3 VWS: CPT

## 2018-11-02 NOTE — PROGRESS NOTES
Assessment/Plan:    No problem-specific Assessment & Plan notes found for this encounter  Diagnoses and all orders for this visit:    Closed compression fracture of first lumbar vertebra, initial encounter ([facility] )  Comments:  Healing  Pt is seeing Neurosurgery  Good support network at home  Using Salonpas patches topically  Order for VNA services and DEXA Scan placed  Late onset Alzheimer's disease without behavioral disturbance  Comments:  Progressive - pt no longer on meds for this ( stopped long ago)  Clinic Nurse Care Mgr now folling too for support  Non-fasting labs ordered  Subjective:      Patient ID: Allison Dukes is a [de-identified] y o  female  Adelina Borja presents with her , Angela Foote today in f/u  All hx comes from Angela Borja tripped and fell 10/7/18 - sustained an L1 compression fx - hospitalized until 10/10/18; then went to [facility] for rehab until 10/24/18  Pt was treated for a UTI, dehydration as well  Nido reports that he is holding up ok  Pt did see Neurosurgery (Dr Anderson Taveras) in f/u - Pt to continue to use a TLSO Brace; has f/u xrays now, and then in about 4 - 6 weeks             The following portions of the patient's history were reviewed and updated as appropriate: allergies, current medications, past family history, past social history, past surgical history and problem list     Past Medical History:   Diagnosis Date    Acute cystitis without hematuria     Last assessed 12/19/15    Diverticulitis of colon     Hyperthyroidism     Polymyalgia rheumatica ([facility] )     Vertigo     last assessed 01/15/2013       Current Outpatient Prescriptions:     acetaminophen (TYLENOL) 325 mg tablet, Take 3 tablets (975 mg total) by mouth every 8 (eight) hours, Disp: 30 tablet, Rfl: 0    aspirin 81 mg chewable tablet, Chew 81 mg daily, Disp: , Rfl:     bisacodyl (DULCOLAX) 10 mg suppository, Insert 1 suppository (10 mg total) into the rectum daily as needed for constipation, Disp: 12 suppository, Rfl: 0    lidocaine (LIDODERM) 5 %, Apply 1 patch topically daily Remove & Discard patch within 12 hours or as directed by MD, Disp: 30 patch, Rfl: 0    melatonin 1 mg, Take by mouth, Disp: , Rfl:     melatonin 3 mg, Take 3 mg by mouth daily at bedtime, Disp: , Rfl:     No Known Allergies    Social History   Substance Use Topics    Smoking status: Never Smoker    Smokeless tobacco: Never Used    Alcohol use No      Comment: social         Review of Systems   Constitutional: Negative for activity change, appetite change and fever  Respiratory: Negative for cough  Gastrointestinal: Positive for constipation  Constipation controlled with suppositories and Miralax  Musculoskeletal:         reports pt shows no signs of pain  Neurological:        Mental status - no acute change; has been continually decreasing  Objective:      /76 (BP Location: Right arm, Patient Position: Sitting, Cuff Size: Standard)   Pulse 64   Temp 98 4 °F (36 9 °C) (Tympanic)   Resp 16   SpO2 96%          Physical Exam   Constitutional: She appears well-developed and well-nourished  No distress  Pt with a short, shuffling gait; walks with the assistance of her   HENT:   Head: Normocephalic and atraumatic  Eyes: Conjunctivae are normal    Neck: Normal range of motion  Neck supple  No thyromegaly present  Cardiovascular: Normal rate, regular rhythm and normal heart sounds  Exam reveals no gallop and no friction rub  No murmur heard  Pulmonary/Chest: Effort normal and breath sounds normal  No respiratory distress  She has no wheezes  She has no rales  Abdominal: Soft  She exhibits no distension and no mass  There is no tenderness  There is no rebound and no guarding  Lymphadenopathy:     She has no cervical adenopathy  Neurological: She is alert  Pt is confused and non-verbal      Skin: She is not diaphoretic     Psychiatric: Her behavior is normal    Nursing note and vitals reviewed

## 2018-11-02 NOTE — PROGRESS NOTES
Outpatient Care Management Note:    Re: received vm from patients spouse requesting return call  State he is interested in a nurse coming to his home for his wife  CM attempted to call patient back x 2 but goes to [email or web address]  [employer] message sent to clinical staff at PCP office to discuss home health nurse with pt and wife at todays visit

## 2018-11-08 ENCOUNTER — APPOINTMENT (OUTPATIENT)
Dept: LAB | Facility: CLINIC | Age: 78
End: 2018-11-08
Payer: MEDICARE

## 2018-11-08 DIAGNOSIS — N30.00 ACUTE CYSTITIS WITHOUT HEMATURIA: ICD-10-CM

## 2018-11-08 DIAGNOSIS — R53.83 FATIGUE, UNSPECIFIED TYPE: ICD-10-CM

## 2018-11-08 DIAGNOSIS — E87.1 HYPONATREMIA: ICD-10-CM

## 2018-11-08 LAB
ANION GAP SERPL CALCULATED.3IONS-SCNC: 5 MMOL/L (ref 4–13)
BASOPHILS # BLD AUTO: 0.05 THOUSANDS/ΜL (ref 0–0.1)
BASOPHILS NFR BLD AUTO: 1 % (ref 0–1)
BUN SERPL-MCNC: 22 MG/DL (ref 5–25)
CALCIUM SERPL-MCNC: 9.6 MG/DL (ref 8.3–10.1)
CHLORIDE SERPL-SCNC: 103 MMOL/L (ref 100–108)
CO2 SERPL-SCNC: 29 MMOL/L (ref 21–32)
CREAT SERPL-MCNC: 0.72 MG/DL (ref 0.6–1.3)
EOSINOPHIL # BLD AUTO: 0.13 THOUSAND/ΜL (ref 0–0.61)
EOSINOPHIL NFR BLD AUTO: 1 % (ref 0–6)
ERYTHROCYTE [DISTWIDTH] IN BLOOD BY AUTOMATED COUNT: 13.2 % (ref 11.6–15.1)
GFR SERPL CREATININE-BSD FRML MDRD: 79 ML/MIN/1.73SQ M
GLUCOSE P FAST SERPL-MCNC: 132 MG/DL (ref 65–99)
HCT VFR BLD AUTO: 45.3 % (ref 34.8–46.1)
HGB BLD-MCNC: 14.2 G/DL (ref 11.5–15.4)
IMM GRANULOCYTES # BLD AUTO: 0.06 THOUSAND/UL (ref 0–0.2)
IMM GRANULOCYTES NFR BLD AUTO: 1 % (ref 0–2)
LYMPHOCYTES # BLD AUTO: 1.33 THOUSANDS/ΜL (ref 0.6–4.47)
LYMPHOCYTES NFR BLD AUTO: 12 % (ref 14–44)
MCH RBC QN AUTO: 32 PG (ref 26.8–34.3)
MCHC RBC AUTO-ENTMCNC: 31.3 G/DL (ref 31.4–37.4)
MCV RBC AUTO: 102 FL (ref 82–98)
MONOCYTES # BLD AUTO: 0.69 THOUSAND/ΜL (ref 0.17–1.22)
MONOCYTES NFR BLD AUTO: 6 % (ref 4–12)
NEUTROPHILS # BLD AUTO: 8.71 THOUSANDS/ΜL (ref 1.85–7.62)
NEUTS SEG NFR BLD AUTO: 79 % (ref 43–75)
NRBC BLD AUTO-RTO: 0 /100 WBCS
PLATELET # BLD AUTO: 353 THOUSANDS/UL (ref 149–390)
PMV BLD AUTO: 8.9 FL (ref 8.9–12.7)
POTASSIUM SERPL-SCNC: 3.5 MMOL/L (ref 3.5–5.3)
RBC # BLD AUTO: 4.44 MILLION/UL (ref 3.81–5.12)
SODIUM SERPL-SCNC: 137 MMOL/L (ref 136–145)
TSH SERPL DL<=0.05 MIU/L-ACNC: 0.66 UIU/ML (ref 0.36–3.74)
WBC # BLD AUTO: 10.97 THOUSAND/UL (ref 4.31–10.16)

## 2018-11-08 PROCEDURE — 84443 ASSAY THYROID STIM HORMONE: CPT

## 2018-11-08 PROCEDURE — 36415 COLL VENOUS BLD VENIPUNCTURE: CPT

## 2018-11-08 PROCEDURE — 85025 COMPLETE CBC W/AUTO DIFF WBC: CPT

## 2018-11-08 PROCEDURE — 80048 BASIC METABOLIC PNL TOTAL CA: CPT

## 2018-11-09 ENCOUNTER — TRANSCRIBE ORDERS (OUTPATIENT)
Dept: LAB | Facility: CLINIC | Age: 78
End: 2018-11-09

## 2018-11-09 ENCOUNTER — APPOINTMENT (OUTPATIENT)
Dept: LAB | Facility: CLINIC | Age: 78
End: 2018-11-09
Payer: MEDICARE

## 2018-11-09 DIAGNOSIS — N30.00 ACUTE CYSTITIS WITHOUT HEMATURIA: ICD-10-CM

## 2018-11-09 DIAGNOSIS — R53.83 FATIGUE, UNSPECIFIED TYPE: ICD-10-CM

## 2018-11-09 DIAGNOSIS — E87.1 HYPOSMOLALITY SYNDROME: Primary | ICD-10-CM

## 2018-11-09 LAB
BACTERIA UR QL AUTO: ABNORMAL /HPF
BILIRUB UR QL STRIP: NEGATIVE
CLARITY UR: ABNORMAL
COLOR UR: YELLOW
GLUCOSE UR STRIP-MCNC: NEGATIVE MG/DL
HGB UR QL STRIP.AUTO: ABNORMAL
KETONES UR STRIP-MCNC: NEGATIVE MG/DL
LEUKOCYTE ESTERASE UR QL STRIP: ABNORMAL
NITRITE UR QL STRIP: NEGATIVE
NON-SQ EPI CELLS URNS QL MICRO: ABNORMAL /HPF
PH UR STRIP.AUTO: 6.5 [PH] (ref 4.5–8)
PROT UR STRIP-MCNC: NEGATIVE MG/DL
RBC #/AREA URNS AUTO: ABNORMAL /HPF
SP GR UR STRIP.AUTO: 1.02 (ref 1–1.03)
UROBILINOGEN UR QL STRIP.AUTO: 0.2 E.U./DL
WBC #/AREA URNS AUTO: ABNORMAL /HPF

## 2018-11-09 PROCEDURE — 81001 URINALYSIS AUTO W/SCOPE: CPT | Performed by: FAMILY MEDICINE

## 2018-11-10 NOTE — PROGRESS NOTES
Please let the patient's  [facility]) know that Annalisa's non-fasting labs were fine  There is no clear sign of infection on her current urinalysis  She is not anemic; her white blood cell count is slightly high - he is to watch Beatris Crockett for any signs of infection (cough, fevers, diarrhea, fouls smelling urine, changes in her mental status, etc)  Her kidney function was fine  Thanks     Dr Hernandez Side

## 2018-11-15 ENCOUNTER — TELEPHONE (OUTPATIENT)
Dept: FAMILY MEDICINE CLINIC | Facility: CLINIC | Age: 78
End: 2018-11-15

## 2018-11-15 DIAGNOSIS — G30.1 LATE ONSET ALZHEIMER'S DISEASE WITHOUT BEHAVIORAL DISTURBANCE (HCC): Chronic | ICD-10-CM

## 2018-11-15 DIAGNOSIS — F02.80 LATE ONSET ALZHEIMER'S DISEASE WITHOUT BEHAVIORAL DISTURBANCE (HCC): Chronic | ICD-10-CM

## 2018-11-15 DIAGNOSIS — S32.010A CLOSED COMPRESSION FRACTURE OF FIRST LUMBAR VERTEBRA, INITIAL ENCOUNTER: Primary | ICD-10-CM

## 2018-11-16 DIAGNOSIS — F02.80 LATE ONSET ALZHEIMER'S DISEASE WITHOUT BEHAVIORAL DISTURBANCE (HCC): Chronic | ICD-10-CM

## 2018-11-16 DIAGNOSIS — G30.1 LATE ONSET ALZHEIMER'S DISEASE WITHOUT BEHAVIORAL DISTURBANCE (HCC): Chronic | ICD-10-CM

## 2018-11-16 DIAGNOSIS — S32.010A CLOSED COMPRESSION FRACTURE OF FIRST LUMBAR VERTEBRA, INITIAL ENCOUNTER: ICD-10-CM

## 2018-11-20 DIAGNOSIS — F02.80 LATE ONSET ALZHEIMER'S DISEASE WITHOUT BEHAVIORAL DISTURBANCE (HCC): Chronic | ICD-10-CM

## 2018-11-20 DIAGNOSIS — G30.1 LATE ONSET ALZHEIMER'S DISEASE WITHOUT BEHAVIORAL DISTURBANCE (HCC): Primary | ICD-10-CM

## 2018-11-20 DIAGNOSIS — S32.010A CLOSED COMPRESSION FRACTURE OF FIRST LUMBAR VERTEBRA, INITIAL ENCOUNTER: ICD-10-CM

## 2018-11-20 DIAGNOSIS — F02.80 LATE ONSET ALZHEIMER'S DISEASE WITHOUT BEHAVIORAL DISTURBANCE (HCC): Primary | ICD-10-CM

## 2018-11-20 DIAGNOSIS — J44.9 CHRONIC OBSTRUCTIVE PULMONARY DISEASE, UNSPECIFIED COPD TYPE (HCC): ICD-10-CM

## 2018-11-20 DIAGNOSIS — G30.1 LATE ONSET ALZHEIMER'S DISEASE WITHOUT BEHAVIORAL DISTURBANCE (HCC): Chronic | ICD-10-CM

## 2018-11-29 ENCOUNTER — HOSPITAL ENCOUNTER (OUTPATIENT)
Dept: RADIOLOGY | Facility: HOSPITAL | Age: 78
Discharge: HOME | End: 2018-11-29
Payer: MEDICARE

## 2018-11-29 DIAGNOSIS — S32.010S CLOSED COMPRESSION FRACTURE OF L1 LUMBAR VERTEBRA, SEQUELA: ICD-10-CM

## 2018-11-29 PROCEDURE — 72100 X-RAY EXAM L-S SPINE 2/3 VWS: CPT

## 2018-12-03 ENCOUNTER — PATIENT OUTREACH (OUTPATIENT)
Dept: OTHER | Facility: HOSPITAL | Age: 78
End: 2018-12-03

## 2018-12-04 ENCOUNTER — OFFICE VISIT (OUTPATIENT)
Dept: NEUROSURGERY | Facility: CLINIC | Age: 78
End: 2018-12-04
Payer: MEDICARE

## 2018-12-04 VITALS
HEART RATE: 64 BPM | BODY MASS INDEX: 21.64 KG/M2 | DIASTOLIC BLOOD PRESSURE: 74 MMHG | HEIGHT: 63 IN | SYSTOLIC BLOOD PRESSURE: 126 MMHG | TEMPERATURE: 98.1 F | RESPIRATION RATE: 16 BRPM

## 2018-12-04 DIAGNOSIS — S32.010D CLOSED COMPRESSION FRACTURE OF L1 LUMBAR VERTEBRA WITH ROUTINE HEALING, SUBSEQUENT ENCOUNTER: Primary | ICD-10-CM

## 2018-12-04 PROCEDURE — 99213 OFFICE O/P EST LOW 20 MIN: CPT | Performed by: PHYSICIAN ASSISTANT

## 2018-12-04 NOTE — LETTER
December 4, 2018     Juandeanna Jasso, [address] [address]  [address] [address]    Patient: Sae Arora   YOB: 1940   Date of Visit: 12/4/2018       Dear Dr Lanette Avery:    Thank you for referring Marco Sheth to me for evaluation  Below are my notes for this consultation  If you have questions, please do not hesitate to call me  I look forward to following your patient along with you  Sincerely,        Curtis Doan PA-C        CC: No Recipients  Curtis Doan PA-C  12/4/2018  2:44 PM  Sign at close encounter  Assessment/Plan:    Very pleasant 59-year-old female, accompanied by her , returns for follow-up, history fall and associated L1 compression approximately 8 weeks ago  Arrives today without her TLSO brace in place, her  reports she refuses to wear it  She has had updated lumbar spine study 11/29/18  Her  reports she appears to have no complaints of pain, she does not speak on her own secondary to dementia  The study was carefully reviewed in detail Dr Jameel Sepulveda compared with prior study 11/2/18 original CT 18 no additional settling or compression is identified the L1 compression fracture overall alignment of the lumbar spine remains appropriate  On examination today there is no pain with palpation or percussion over the thoracolumbar spine  And she appears to have no discomfort with flexion or extension of her back  Motor examination lower extremities is 5 x 5 for power  There is no reported bowel or bladder problems, and no change in her gait according to her   Further follow-up with Neurosurgery will be on as-needed basis  As she has discontinued brace more than 3 weeks ago there is no need to wean from that at this time  Her  does understand should there be any new onset of pain or any new changes he should return for reassessment  Otherwise gradually return to all usual activities      I did discuss osteoporosis management with her ,  he reports they did discuss this with his primary care provider who felt as though at this point and time they would not proceed with testing or management  These findings, impressions and recommendations are reviewed in great detail with the , he expressed understanding and agreement, his questions were answered completely and to his satisfaction  Diagnoses and all orders for this visit:    Closed compression fracture of L1 lumbar vertebra with routine healing, subsequent encounter          Return if symptoms worsen or fail to improve  Subjective:      Patient ID: Ramon Vergara is a [de-identified] y o  female  HPI    The following portions of the patient's history were reviewed and updated as appropriate: allergies, current medications, past family history, past medical history, past social history and past surgical history  Review of Systems   Constitutional: Negative  HENT: Negative  Eyes: Negative  Respiratory: Negative  Cardiovascular: Negative  Gastrointestinal: Negative  Endocrine: Negative  Genitourinary: Negative  Musculoskeletal: Negative  Skin: Negative  Allergic/Immunologic: Negative  Neurological: Negative  Hematological: Negative  Psychiatric/Behavioral: Negative  Objective:    Physical Exam   Constitutional: She appears well-developed and well-nourished  Cardiovascular: Normal rate  Pulmonary/Chest: Effort normal and breath sounds normal    Musculoskeletal: Normal range of motion  There is no pain with palpation or percussion over the thoracolumbar spine  There is no obvious pain with flexion or extension  Skin: Skin is warm  Neurologic Exam       LUMBAR SPINE   11/29/18     INDICATION:   S32 010S: Wedge compression fracture of first lumbar vertebra, sequela    L1 fracture      COMPARISON:  Lumbar spine series of 11/2/2018     VIEWS:  XR SPINE LUMBAR 2 OR 3 VIEWS INJURY  Images: 2     FINDINGS:     Moderate to severe compression deformities noted of the L1 vertebral body secondary to superior endplate deformity  This is stable      The bones are demineralized      Mild curvature is noted of the lumbar spine to the left    No subluxation      Mild to moderate disc space narrowing is noted of the lumbar spine with scattered mild marginal osteophyte formation      The pedicles appear intact      Dense vascular calcifications are seen      IMPRESSION:     Moderate to severe compression deformity of the L1 vertebral body, unchanged

## 2018-12-04 NOTE — PROGRESS NOTES
Assessment/Plan:    Very pleasant 59-year-old female, accompanied by her , returns for follow-up, history fall and associated L1 compression approximately 8 weeks ago  Arrives today without her TLSO brace in place, her  reports she refuses to wear it  She has had updated lumbar spine study 11/29/18  Her  reports she appears to have no complaints of pain, she does not speak on her own secondary to dementia  The study was carefully reviewed in detail Dr Crissy Arndt compared with prior study 11/2/18 original CT 18 no additional settling or compression is identified the L1 compression fracture overall alignment of the lumbar spine remains appropriate  On examination today there is no pain with palpation or percussion over the thoracolumbar spine  And she appears to have no discomfort with flexion or extension of her back  Motor examination lower extremities is 5 x 5 for power  There is no reported bowel or bladder problems, and no change in her gait according to her   Further follow-up with Neurosurgery will be on as-needed basis  As she has discontinued brace more than 3 weeks ago there is no need to wean from that at this time  Her  does understand should there be any new onset of pain or any new changes he should return for reassessment  Otherwise gradually return to all usual activities  I did discuss osteoporosis management with her ,  he reports they did discuss this with his primary care provider who felt as though at this point and time they would not proceed with testing or management  These findings, impressions and recommendations are reviewed in great detail with the , he expressed understanding and agreement, his questions were answered completely and to his satisfaction             Diagnoses and all orders for this visit:    Closed compression fracture of L1 lumbar vertebra with routine healing, subsequent encounter Return if symptoms worsen or fail to improve  Subjective:      Patient ID: Fam Jameson is a [de-identified] y o  female  HPI    The following portions of the patient's history were reviewed and updated as appropriate: allergies, current medications, past family history, past medical history, past social history and past surgical history  Review of Systems   Constitutional: Negative  HENT: Negative  Eyes: Negative  Respiratory: Negative  Cardiovascular: Negative  Gastrointestinal: Negative  Endocrine: Negative  Genitourinary: Negative  Musculoskeletal: Negative  Skin: Negative  Allergic/Immunologic: Negative  Neurological: Negative  Hematological: Negative  Psychiatric/Behavioral: Negative  Objective:    Physical Exam   Constitutional: She appears well-developed and well-nourished  Cardiovascular: Normal rate  Pulmonary/Chest: Effort normal and breath sounds normal    Musculoskeletal: Normal range of motion  There is no pain with palpation or percussion over the thoracolumbar spine  There is no obvious pain with flexion or extension  Skin: Skin is warm  Neurologic Exam       LUMBAR SPINE   11/29/18     INDICATION:   S32 010S: Wedge compression fracture of first lumbar vertebra, sequela  L1 fracture      COMPARISON:  Lumbar spine series of 11/2/2018     VIEWS:  XR SPINE LUMBAR 2 OR 3 VIEWS INJURY  Images: 2     FINDINGS:     Moderate to severe compression deformities noted of the L1 vertebral body secondary to superior endplate deformity  This is stable      The bones are demineralized      Mild curvature is noted of the lumbar spine to the left    No subluxation      Mild to moderate disc space narrowing is noted of the lumbar spine with scattered mild marginal osteophyte formation      The pedicles appear intact      Dense vascular calcifications are seen      IMPRESSION:     Moderate to severe compression deformity of the L1 vertebral body, unchanged

## 2018-12-04 NOTE — PATIENT INSTRUCTIONS
Gradually return to all usual activities  Be cautious about falls  Further follow-up with Neurosurgery will be on an as-needed basis  Should they remained increased her new in the back return for further follow-up      May consider discussing osteoporosis management with your primary care provider

## 2018-12-05 ENCOUNTER — TELEPHONE (OUTPATIENT)
Dept: OTHER | Facility: OTHER | Age: 78
End: 2018-12-05

## 2018-12-05 ENCOUNTER — OFFICE VISIT (OUTPATIENT)
Dept: FAMILY MEDICINE CLINIC | Facility: CLINIC | Age: 78
End: 2018-12-05
Payer: MEDICARE

## 2018-12-05 VITALS
TEMPERATURE: 99.3 F | RESPIRATION RATE: 16 BRPM | OXYGEN SATURATION: 95 % | SYSTOLIC BLOOD PRESSURE: 122 MMHG | HEART RATE: 75 BPM | DIASTOLIC BLOOD PRESSURE: 60 MMHG

## 2018-12-05 DIAGNOSIS — Z78.0 POSTMENOPAUSAL: ICD-10-CM

## 2018-12-05 DIAGNOSIS — F02.80 LATE ONSET ALZHEIMER'S DISEASE WITHOUT BEHAVIORAL DISTURBANCE (HCC): Primary | Chronic | ICD-10-CM

## 2018-12-05 DIAGNOSIS — Z00.00 MEDICARE ANNUAL WELLNESS VISIT, SUBSEQUENT: ICD-10-CM

## 2018-12-05 DIAGNOSIS — S32.010D CLOSED COMPRESSION FRACTURE OF L1 LUMBAR VERTEBRA WITH ROUTINE HEALING, SUBSEQUENT ENCOUNTER: ICD-10-CM

## 2018-12-05 DIAGNOSIS — G30.1 LATE ONSET ALZHEIMER'S DISEASE WITHOUT BEHAVIORAL DISTURBANCE (HCC): Primary | Chronic | ICD-10-CM

## 2018-12-05 DIAGNOSIS — Z13.1 SCREENING FOR DIABETES MELLITUS (DM): ICD-10-CM

## 2018-12-05 DIAGNOSIS — Z91.81 RISK FOR FALLS: ICD-10-CM

## 2018-12-05 PROCEDURE — G0439 PPPS, SUBSEQ VISIT: HCPCS | Performed by: FAMILY MEDICINE

## 2018-12-05 PROCEDURE — 99214 OFFICE O/P EST MOD 30 MIN: CPT | Performed by: FAMILY MEDICINE

## 2018-12-05 RX ORDER — DONEPEZIL HYDROCHLORIDE 5 MG/1
5 TABLET, FILM COATED ORAL
Qty: 30 TABLET | Refills: 5 | Status: SHIPPED | OUTPATIENT
Start: 2018-12-05

## 2018-12-05 NOTE — TELEPHONE ENCOUNTER
Andre Johnson 1940  CONFIDENTIALTY NOTICE: This fax transmission is intended only for the addressee  It contains information that is legally privileged,  confidential or otherwise protected from use or disclosure  If you are not the intended recipient, you are strictly prohibited from reviewing,  disclosing, copying using or disseminating any of this information or taking any action in reliance on or regarding this information  If you have  received this fax in error, please notify us immediately by telephone so that we can arrange for its return to us  Page:   Call Id: [id number]  Health Call  Standard Call Report  Health Call  Patient Name: Andre Johnson  Gender: Female  : 1940  Age: [de-identified] Y 1 M 6 D  Return Phone  Number: [phone] (Current)  Address: [address]  City/State/Zip: [facility] [address]  Practice Name: [facility] Charged:  Physician:  [address] [facility] Name: Nido  Relationship To  Patient: Spouse  Return Phone Number: [phone] (Current)  Presenting Problem: "I have a concern regarding my wife's  medication "  Service Type: Triage  Charged Service 1: Messages  Pharmacy Name and  Number:  Nurse Assessment  Protocols  Protocol Title Nurse Date/Time  Disp  Time Disposition Final User  2018 5:14:21 PM Send to Follow Up Elise Braun RN, Rory Glover  2018 5:46:38 PM Close Yes Davi Goff RN, Rory Glover  Comments  User: Lorne Alcaraz RN Date/Time: 2018 5:14:10 PM  Attempted to call patient's  back  Rings several times and then goes to voice mail  Message left for  to call back  I will attempt a second call within the next 15 - 20 minutes  User: Lorne Alcaraz RN Date/Time: 2018 5:46:31 PM  Got correct number  Called and spoke to patient's    concerned that patient takes Trazodone and was just  prescribed Aricept  Based on information that I had, I was unclear about interaction    was going to  prescriptions  this evening  Advised patient to speak with pharmacist  If pharmacist feels that there is an interaction between the 2, pharmacy  can then page the MD on call or wait until the office is open tomorrow to discuss with MD   verbalized understanding

## 2018-12-05 NOTE — PROGRESS NOTES
Assessment/Plan:    No problem-specific Assessment & Plan notes found for this encounter  Diagnoses and all orders for this visit:    Late onset Alzheimer's disease without behavioral disturbance  Comments:  Ongoing, progressive  Discussed with  - will try low-dose Aricept to help behaviors;  aware that this will not help her memory, etc   Orders:  -     donepezil (ARICEPT) 5 mg tablet; Take 1 tablet (5 mg total) by mouth daily at bedtime    Closed compression fracture of L1 lumbar vertebra with routine healing, subsequent encounter  Comments:  Hx of; stable - pt improving   declines the addition of something like weekly Fosamax, Prolia injections, etc     Medicare annual wellness visit, subsequent  Comments:  Exam completed today  Risk for falls    Screening for diabetes mellitus (DM)    Postmenopausal          Subjective:      Patient ID: Herrera Zimmer is a [de-identified] y o  female  Given pt's advanced dementia hx and age, no longer candidate for colon cancer screening, mammogram, etc   Hx given by her , Sherren Earnest  Immunizations incl Flu Vaccine are UTD at this time;  declines Shingrix for pt  Pt did see Neurosurgery in f/u just yesterday - no complaints of pain at this time;  declines placing Franklin Ann on Fosamax, etc - discussed  She has had no more falls since the last that she had, leading to hospitalization  More headstrong now; tougher for Nido to manage at times  We discussed options for pt care; he continues to have very good family support system          The following portions of the patient's history were reviewed and updated as appropriate: allergies, current medications, past family history, past social history, past surgical history and problem list     Past Medical History:   Diagnosis Date    Acute cystitis without hematuria     Last assessed 12/19/15    Diverticulitis of colon     Hyperthyroidism     [facility]ym[facility] rhe[facility]a [facility] [address] )     Vertigo last assessed 01/15/2013       Current Outpatient Prescriptions:     acetaminophen (TYLENOL) 325 mg tablet, Take 3 tablets (975 mg total) by mouth every 8 (eight) hours, Disp: 30 tablet, Rfl: 0    aspirin 81 mg chewable tablet, Chew 81 mg daily, Disp: , Rfl:     bisacodyl (DULCOLAX) 10 mg suppository, Insert 1 suppository (10 mg total) into the rectum daily as needed for constipation, Disp: 12 suppository, Rfl: 0    donepezil (ARICEPT) 5 mg tablet, Take 1 tablet (5 mg total) by mouth daily at bedtime, Disp: 30 tablet, Rfl: 5    lidocaine (LIDODERM) 5 %, Apply 1 patch topically daily Remove & Discard patch within 12 hours or as directed by MD, Disp: 30 patch, Rfl: 0    melatonin 1 mg, Take by mouth, Disp: , Rfl:     melatonin 3 mg, Take 3 mg by mouth daily at bedtime, Disp: , Rfl:     No Known Allergies    Social History   Substance Use Topics    Smoking status: Never Smoker    Smokeless tobacco: Never Used    Alcohol use No      Comment: social         Review of Systems   Constitutional: Negative for activity change  Respiratory: Negative for shortness of breath  Gastrointestinal: Negative for blood in stool  Genitourinary: Negative for difficulty urinating  Neurological:        Hx of advanced Alzheimer's  Psychiatric/Behavioral: Positive for agitation  More difficulty getting pt to get into car, get up and move, etc          Objective:      /60 (BP Location: Left arm, Patient Position: Sitting, Cuff Size: Standard)   Pulse 75   Temp 99 3 °F (37 4 °C) (Tympanic)   Resp 16   SpO2 95%          Physical Exam   Constitutional: She appears well-developed and well-nourished  No distress  Pt is now non-verbal; alert but confused; non-toxic appearing  Pt is in a wheelchair during exam today  HENT:   Head: Normocephalic and atraumatic  Eyes: Conjunctivae are normal    Neck: Normal range of motion  Neck supple  No thyromegaly present  Cardiovascular: Exam reveals no gallop  Pulmonary/Chest: Effort normal and breath sounds normal  No respiratory distress  She has no wheezes  She has no rales  Abdominal: Soft  Bowel sounds are normal  She exhibits no distension and no mass  There is no tenderness  There is no rebound and no guarding  Lymphadenopathy:     She has no cervical adenopathy  Neurological: She is alert  Skin: She is not diaphoretic  Nursing note and vitals reviewed

## 2018-12-06 ENCOUNTER — TELEPHONE (OUTPATIENT)
Dept: FAMILY MEDICINE CLINIC | Facility: CLINIC | Age: 78
End: 2018-12-06

## 2018-12-06 DIAGNOSIS — N30.00 ACUTE CYSTITIS WITHOUT HEMATURIA: Primary | ICD-10-CM

## 2018-12-06 NOTE — TELEPHONE ENCOUNTER
Patient's  was notified of the medication question for trazodone and donepezil that there were no drug interactions  I also had to cancel the script for donepezil that went to the wrong [facility] and resent it to the the one in [place]  [facility]  He states that he has been cleaning her with just water (advised by the nurse that comes to the house)  But he says she has been having problems and is unsure if she had a urine infection  He is inquriing how he would be able to obtain a urine sample if she uses diapers/how could he test for that? Also, is there something else he can do when cleaning her? Please advise

## 2018-12-06 NOTE — TELEPHONE ENCOUNTER
Spoke to PT  he states Pt is incontinent however will try to toilet to obtain urine sample can he bring sample to office?  Or should he go to the lab

## 2018-12-06 NOTE — TELEPHONE ENCOUNTER
I have heard of no interactions between these drugs  I went to my online medication resource as a precaution, and there was NO interaction found  They are ok to take  Please let Mr Javier Mendez know  Thanks     David

## 2018-12-06 NOTE — TELEPHONE ENCOUNTER
Just warm water and soap, something like Dove  I will order a urine sample - pt's  will likely need a hat for the commode to collect  Thanks    David

## 2018-12-07 ENCOUNTER — TRANSCRIBE ORDERS (OUTPATIENT)
Dept: LAB | Facility: CLINIC | Age: 78
End: 2018-12-07

## 2018-12-07 ENCOUNTER — APPOINTMENT (OUTPATIENT)
Dept: LAB | Facility: CLINIC | Age: 78
End: 2018-12-07
Payer: MEDICARE

## 2018-12-07 DIAGNOSIS — N30.00 ACUTE CYSTITIS WITHOUT HEMATURIA: Primary | ICD-10-CM

## 2018-12-07 LAB
AMORPH URATE CRY URNS QL MICRO: ABNORMAL /HPF
BACTERIA UR QL AUTO: ABNORMAL /HPF
BILIRUB UR QL STRIP: NEGATIVE
CLARITY UR: ABNORMAL
COLOR UR: YELLOW
GLUCOSE UR STRIP-MCNC: NEGATIVE MG/DL
HGB UR QL STRIP.AUTO: ABNORMAL
KETONES UR STRIP-MCNC: NEGATIVE MG/DL
LEUKOCYTE ESTERASE UR QL STRIP: ABNORMAL
NITRITE UR QL STRIP: POSITIVE
NON-SQ EPI CELLS URNS QL MICRO: ABNORMAL /HPF
PH UR STRIP.AUTO: 6.5 [PH] (ref 4.5–8)
PROT UR STRIP-MCNC: NEGATIVE MG/DL
RBC #/AREA URNS AUTO: ABNORMAL /HPF
SP GR UR STRIP.AUTO: 1.02 (ref 1–1.03)
UROBILINOGEN UR QL STRIP.AUTO: 0.2 E.U./DL
WBC #/AREA URNS AUTO: ABNORMAL /HPF

## 2018-12-07 PROCEDURE — 81001 URINALYSIS AUTO W/SCOPE: CPT | Performed by: FAMILY MEDICINE

## 2018-12-07 RX ORDER — CIPROFLOXACIN 500 MG/1
500 TABLET, FILM COATED ORAL EVERY 12 HOURS SCHEDULED
Qty: 14 TABLET | Refills: 0 | Status: SHIPPED | OUTPATIENT
Start: 2018-12-07 | End: 2018-12-14

## 2019-01-04 DIAGNOSIS — F51.01 PRIMARY INSOMNIA: Primary | ICD-10-CM

## 2019-01-04 RX ORDER — TRAZODONE HYDROCHLORIDE 50 MG/1
50 TABLET ORAL
Qty: 30 TABLET | Refills: 0 | Status: SHIPPED | OUTPATIENT
Start: 2019-01-04 | End: 2019-02-03

## 2019-01-07 ENCOUNTER — PATIENT OUTREACH (OUTPATIENT)
Dept: FAMILY MEDICINE CLINIC | Facility: CLINIC | Age: 79
End: 2019-01-07

## 2019-01-07 NOTE — PROGRESS NOTES
End of bundled episode   had declined to participate in outpatient care management program   Provided him my name and contact information if future assistance desired

## 2019-02-07 ENCOUNTER — TELEPHONE (OUTPATIENT)
Dept: FAMILY MEDICINE CLINIC | Facility: CLINIC | Age: 79
End: 2019-02-07

## 2019-02-07 DIAGNOSIS — N30.00 ACUTE CYSTITIS WITHOUT HEMATURIA: Primary | ICD-10-CM

## 2019-02-07 RX ORDER — CIPROFLOXACIN 500 MG/1
500 TABLET, FILM COATED ORAL EVERY 12 HOURS SCHEDULED
Qty: 14 TABLET | Refills: 0 | Status: SHIPPED | OUTPATIENT
Start: 2019-02-07 | End: 2019-02-14

## 2019-02-07 NOTE — TELEPHONE ENCOUNTER
I ordered Cipro to their [facility] in [facility]  I do believe that in the future, she could be a candidate for low dose Macrobid daily, but would not start right now  Thanks      David

## 2019-02-07 NOTE — TELEPHONE ENCOUNTER
Patients  called and stated he tested her urine for a UTI and it tested positive, they are in [place] and wanted to know if you can send something to the pharmacy   says since she constantly gets them he wanted to know if there was a slow/low dosage that can be given all the time since its hard for him to get a sample         [facility] is [facility]

## 2019-03-08 ENCOUNTER — TELEPHONE (OUTPATIENT)
Dept: FAMILY MEDICINE CLINIC | Facility: CLINIC | Age: 79
End: 2019-03-08

## 2019-03-08 DIAGNOSIS — N30.00 ACUTE CYSTITIS WITHOUT HEMATURIA: Primary | ICD-10-CM

## 2019-03-08 RX ORDER — LEVOFLOXACIN 25 MG/ML
500 SOLUTION ORAL DAILY
Qty: 150 ML | Refills: 0 | Status: SHIPPED | OUTPATIENT
Start: 2019-03-08 | End: 2019-03-15

## 2019-03-08 RX ORDER — CIPROFLOXACIN 500 MG/5ML
500 KIT ORAL 2 TIMES DAILY
Qty: 100 ML | Refills: 0 | Status: SHIPPED | OUTPATIENT
Start: 2019-03-08 | End: 2019-03-08 | Stop reason: ALTCHOICE

## 2019-03-08 NOTE — TELEPHONE ENCOUNTER
Pharmacist from [facility] in [facility] called to notify us that the Cipro liquid is on backorder, and they looked for other [facility] within the [facility] area to see if it was in stock there without any luck  She also tried to see if a lower dose was available but no success as well  She said that a generic liquid for Bactrim is available if you want to go first line antibiotic  She also recommended Levaquin (levafloxacin) in a liquid form for a stronger antibiotic  Please advise       Jorge A  [phone]

## 2019-03-08 NOTE — TELEPHONE ENCOUNTER
Patients  called and tested her Urine and she has a bladder infection again and he wanted to know if you can send something to the pharmacy and he wanted to know if it comes in a liquid form, he is having a difficult time giving her the pills   Please advise      Pharmacy is [facility] in [place]

## 2019-04-08 ENCOUNTER — TELEPHONE (OUTPATIENT)
Dept: FAMILY MEDICINE CLINIC | Facility: CLINIC | Age: 79
End: 2019-04-08

## 2019-04-08 DIAGNOSIS — N39.0 RECURRENT UTI (URINARY TRACT INFECTION): Primary | Chronic | ICD-10-CM

## 2019-04-08 RX ORDER — NITROFURANTOIN MACROCRYSTALS 50 MG/1
50 CAPSULE ORAL
Qty: 90 CAPSULE | Refills: 1 | Status: SHIPPED | OUTPATIENT
Start: 2019-04-08 | End: 2019-05-03 | Stop reason: DRUGHIGH

## 2019-04-15 ENCOUNTER — TELEPHONE (OUTPATIENT)
Dept: FAMILY MEDICINE CLINIC | Facility: CLINIC | Age: 79
End: 2019-04-15

## 2019-04-29 ENCOUNTER — TRANSITIONAL CARE MANAGEMENT (OUTPATIENT)
Dept: FAMILY MEDICINE CLINIC | Facility: CLINIC | Age: 79
End: 2019-04-29

## 2019-04-29 ENCOUNTER — TELEPHONE (OUTPATIENT)
Dept: FAMILY MEDICINE CLINIC | Facility: CLINIC | Age: 79
End: 2019-04-29

## 2019-04-30 ENCOUNTER — TRANSITIONAL CARE MANAGEMENT (OUTPATIENT)
Dept: FAMILY MEDICINE CLINIC | Facility: CLINIC | Age: 79
End: 2019-04-30

## 2019-05-02 ENCOUNTER — TELEPHONE (OUTPATIENT)
Dept: FAMILY MEDICINE CLINIC | Facility: CLINIC | Age: 79
End: 2019-05-02

## 2019-05-03 ENCOUNTER — OFFICE VISIT (OUTPATIENT)
Dept: FAMILY MEDICINE CLINIC | Facility: CLINIC | Age: 79
End: 2019-05-03
Payer: MEDICARE

## 2019-05-03 VITALS — DIASTOLIC BLOOD PRESSURE: 68 MMHG | SYSTOLIC BLOOD PRESSURE: 120 MMHG | OXYGEN SATURATION: 94 % | HEART RATE: 71 BPM

## 2019-05-03 DIAGNOSIS — G30.1 LATE ONSET ALZHEIMER'S DISEASE WITHOUT BEHAVIORAL DISTURBANCE (HCC): Chronic | ICD-10-CM

## 2019-05-03 DIAGNOSIS — S32.010D CLOSED COMPRESSION FRACTURE OF L1 LUMBAR VERTEBRA WITH ROUTINE HEALING, SUBSEQUENT ENCOUNTER: ICD-10-CM

## 2019-05-03 DIAGNOSIS — K59.00 CONSTIPATION, UNSPECIFIED CONSTIPATION TYPE: ICD-10-CM

## 2019-05-03 DIAGNOSIS — F02.80 LATE ONSET ALZHEIMER'S DISEASE WITHOUT BEHAVIORAL DISTURBANCE (HCC): Chronic | ICD-10-CM

## 2019-05-03 DIAGNOSIS — G47.00 INSOMNIA, UNSPECIFIED TYPE: ICD-10-CM

## 2019-05-03 DIAGNOSIS — N30.00 ACUTE CYSTITIS WITHOUT HEMATURIA: ICD-10-CM

## 2019-05-03 DIAGNOSIS — N39.0 RECURRENT UTI (URINARY TRACT INFECTION): Chronic | ICD-10-CM
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
## 2019-05-03 PROCEDURE — 1111F DSCHRG MED/CURRENT MED MERGE: CPT | Performed by: FAMILY MEDICINE

## 2019-05-03 PROCEDURE — 99495 TRANSJ CARE MGMT MOD F2F 14D: CPT | Performed by: FAMILY MEDICINE

## 2019-05-03 RX ORDER — POLYETHYLENE GLYCOL 3350 17 G/17G
17 POWDER, FOR SOLUTION ORAL DAILY
Qty: 30 EACH | Refills: 5 | Status: SHIPPED | OUTPATIENT
Start: 2019-05-03

## 2019-05-03 RX ORDER — NITROFURANTOIN 25 MG/5ML
50 SUSPENSION ORAL DAILY
Qty: 300 ML | Refills: 5 | Status: SHIPPED | OUTPATIENT
Start: 2019-05-03 | End: 2019-05-06 | Stop reason: SDUPTHER

## 2019-05-06 ENCOUNTER — TELEPHONE (OUTPATIENT)
Dept: FAMILY MEDICINE CLINIC | Facility: CLINIC | Age: 79
End: 2019-05-06

## 2019-05-06 DIAGNOSIS — N30.00 ACUTE CYSTITIS WITHOUT HEMATURIA: Primary | ICD-10-CM

## 2019-05-06 RX ORDER — NITROFURANTOIN MACROCRYSTALS 50 MG/1
50 CAPSULE ORAL DAILY
Qty: 30 CAPSULE | Refills: 5 | Status: SHIPPED | OUTPATIENT
Start: 2019-05-06 | End: 2019-12-21 | Stop reason: SDUPTHER

## 2019-05-22 ENCOUNTER — TELEPHONE (OUTPATIENT)
Dept: FAMILY MEDICINE CLINIC | Facility: CLINIC | Age: 79
End: 2019-05-22

## 2019-05-22 DIAGNOSIS — G30.1 LATE ONSET ALZHEIMER'S DISEASE WITHOUT BEHAVIORAL DISTURBANCE (HCC): Primary | Chronic | ICD-10-CM

## 2019-05-22 DIAGNOSIS — F02.80 LATE ONSET ALZHEIMER'S DISEASE WITHOUT BEHAVIORAL DISTURBANCE (HCC): Primary | Chronic | ICD-10-CM

## 2019-05-29 ENCOUNTER — TELEPHONE (OUTPATIENT)
Dept: FAMILY MEDICINE CLINIC | Facility: CLINIC | Age: 79
End: 2019-05-29

## 2019-06-05 ENCOUNTER — OFFICE VISIT (OUTPATIENT)
Dept: FAMILY MEDICINE CLINIC | Facility: CLINIC | Age: 79
End: 2019-06-05

## 2019-06-05 DIAGNOSIS — F02.80 LATE ONSET ALZHEIMER'S DISEASE WITHOUT BEHAVIORAL DISTURBANCE (HCC): Primary | ICD-10-CM

## 2019-06-05 DIAGNOSIS — G30.1 LATE ONSET ALZHEIMER'S DISEASE WITHOUT BEHAVIORAL DISTURBANCE (HCC): Primary | ICD-10-CM

## 2019-07-16 ENCOUNTER — TELEPHONE (OUTPATIENT)
Dept: FAMILY MEDICINE CLINIC | Facility: CLINIC | Age: 79
End: 2019-07-16

## 2019-07-16 DIAGNOSIS — N39.0 RECURRENT UTI (URINARY TRACT INFECTION): Primary | ICD-10-CM

## 2019-07-16 RX ORDER — CIPROFLOXACIN 500 MG/1
500 TABLET, FILM COATED ORAL EVERY 12 HOURS SCHEDULED
Qty: 10 TABLET | Refills: 0 | Status: SHIPPED | OUTPATIENT
Start: 2019-07-16 | End: 2019-07-21

## 2019-07-16 NOTE — TELEPHONE ENCOUNTER
Patient's nurse called and patient is having behavior changes and decreased urination with foul odor  Can you prescribe antibiotic?

## 2019-07-23 ENCOUNTER — TELEPHONE (OUTPATIENT)
Dept: FAMILY MEDICINE CLINIC | Facility: CLINIC | Age: 79
End: 2019-07-23

## 2019-07-23 NOTE — TELEPHONE ENCOUNTER
Bonita Chen from [facility] [address] called to let us know that the patient had a fall on Sunday  The nurse went out to check on her that day and got her back into bed, but since then they have been unable to get her out of bed  Any time they try to raise the head of bed she screams and holds her back  They are requesting if she can get an xray of her back? She has a history of lumbar fractures  Please advise      Fax: [address]: [phone]

## 2019-07-24 DIAGNOSIS — S32.010D CLOSED COMPRESSION FRACTURE OF L1 LUMBAR VERTEBRA WITH ROUTINE HEALING, SUBSEQUENT ENCOUNTER: ICD-10-CM

## 2019-07-24 DIAGNOSIS — M54.50 ACUTE BILATERAL LOW BACK PAIN WITHOUT SCIATICA: ICD-10-CM

## 2019-07-24 DIAGNOSIS — M54.6 ACUTE BILATERAL THORACIC BACK PAIN: Primary | ICD-10-CM

## 2019-07-31 ENCOUNTER — TELEPHONE (OUTPATIENT)
Dept: FAMILY MEDICINE CLINIC | Facility: CLINIC | Age: 79
End: 2019-07-31

## 2019-07-31 NOTE — TELEPHONE ENCOUNTER
Christine Steen from [facility] called and stated patient had a fall a couple weeks ago, and x rays were done and there was nothing acute, but the family wanted to know if you can put in an order for PT evaluation   Please advise          Fax number: [phone]

## 2019-08-14 ENCOUNTER — TELEPHONE (OUTPATIENT)
Dept: FAMILY MEDICINE CLINIC | Facility: CLINIC | Age: 79
End: 2019-08-14

## 2019-08-14 DIAGNOSIS — B37.3 VULVOVAGINAL CANDIDIASIS: Primary | ICD-10-CM

## 2019-08-14 RX ORDER — FLUCONAZOLE 150 MG/1
150 TABLET ORAL ONCE
Qty: 1 TABLET | Refills: 0 | Status: SHIPPED | OUTPATIENT
Start: 2019-08-14 | End: 2019-08-14

## 2019-08-14 NOTE — TELEPHONE ENCOUNTER
SALLY FROM [facility] CALLED AND PT HAD UTI AND TOOK CIPRO AND IS DONE WITH IT  HOWEVER SALLY SAID SHE NOW HAS A YEAST INFECTION AND COULD YOU SEND ORAL DIFLUCAN TO [facility] ON FILE PLS

## 2019-09-02 NOTE — ASSESSMENT & PLAN NOTE
· POA, patient returning to baseline mental functioning as per   Urinalysis shows findings suggestive of urinary tract infection   Lab did not receive specimen for culture  · Continue IV Ceftriaxone for 3 days total - will complete tomorrow normal...

## 2019-10-16 ENCOUNTER — TELEPHONE (OUTPATIENT)
Dept: FAMILY MEDICINE CLINIC | Facility: CLINIC | Age: 79
End: 2019-10-16

## 2019-10-16 NOTE — TELEPHONE ENCOUNTER
Patient's nurse called requesting a verbal for patient to take 5 mg melatonin nightly so it will be covered  Patient's  has been giving over the counter and has been successful  Nurse Elian Wheat stated to call her, not pharmacy, and she will call [address] herself      [phone]

## 2019-10-17 NOTE — TELEPHONE ENCOUNTER
Spoke to Carrie from [facility] she is also one of [employer] and given the verbal ok for pt to take Melatonin at night

## 2019-11-27 ENCOUNTER — TELEPHONE (OUTPATIENT)
Dept: FAMILY MEDICINE CLINIC | Facility: CLINIC | Age: 79
End: 2019-11-27

## 2019-11-27 NOTE — TELEPHONE ENCOUNTER
Josemanuel Mckeon from [facility] called to say they are discontinuing hospice care due to prolonged illness  Would Dr Makenzie Duncan be willing to write a few new prescriptions:    DME: A hospital bed with air mattress    Referral to [address] [place] [address] Martin Smith for Eval of her needs  Fax to: [phone]    Please call Josemanuel Mckeon @ [facility] if this is not doable  Thank you!

## 2019-11-29 DIAGNOSIS — G30.1 LATE ONSET ALZHEIMER'S DISEASE WITHOUT BEHAVIORAL DISTURBANCE (HCC): Primary | Chronic | ICD-10-CM

## 2019-11-29 DIAGNOSIS — F02.80 LATE ONSET ALZHEIMER'S DISEASE WITHOUT BEHAVIORAL DISTURBANCE (HCC): Primary | Chronic | ICD-10-CM

## 2019-12-02 NOTE — TELEPHONE ENCOUNTER
Colleen Khan from [facility] called again wanting to know the status   Please advise

## 2019-12-10 NOTE — TELEPHONE ENCOUNTER
Marcia Salazar called again and [facility] still has not gotten fax  She does not have the fax number to verify correct

## 2019-12-12 ENCOUNTER — TELEPHONE (OUTPATIENT)
Dept: FAMILY MEDICINE CLINIC | Facility: CLINIC | Age: 79
End: 2019-12-12

## 2019-12-12 NOTE — TELEPHONE ENCOUNTER
[address] BED IN April OF THIS YEAR SO SHE DONT QUALIFY FOR 5 YEARS BUT THEY CAN PUT THE ORDER IN FOR THE AIR MATTRESS FOR HER

## 2019-12-17 ENCOUNTER — TELEPHONE (OUTPATIENT)
Dept: FAMILY MEDICINE CLINIC | Facility: CLINIC | Age: 79
End: 2019-12-17

## 2019-12-17 NOTE — TELEPHONE ENCOUNTER
Ray Callaway from [facility] called  Peggy is telling her that they want clinical documentation as to why she needs an air mattress  Please fax this information to [facility]  Thank you!

## 2019-12-18 NOTE — TELEPHONE ENCOUNTER
Letter done, will fax [FreeTextEntry1] : PFT results:Limited OAD. Significant bronchodilator response. Mild restriction with decreased diffusion

## 2019-12-21 DIAGNOSIS — N30.00 ACUTE CYSTITIS WITHOUT HEMATURIA: ICD-10-CM

## 2019-12-21 DIAGNOSIS — I10 ESSENTIAL HYPERTENSION: Primary | ICD-10-CM

## 2019-12-21 DIAGNOSIS — G47.00 INSOMNIA, UNSPECIFIED TYPE: ICD-10-CM

## 2019-12-21 RX ORDER — NITROFURANTOIN MACROCRYSTALS 50 MG/1
CAPSULE ORAL
Qty: 30 CAPSULE | Refills: 0 | Status: SHIPPED | OUTPATIENT
Start: 2019-12-21 | End: 2020-04-24

## 2019-12-21 RX ORDER — AMLODIPINE BESYLATE 5 MG/1
TABLET ORAL
Qty: 90 TABLET | Refills: 1 | Status: SHIPPED | OUTPATIENT
Start: 2019-12-21 | End: 2020-06-24

## 2019-12-21 RX ORDER — TRAZODONE HYDROCHLORIDE 50 MG/1
TABLET ORAL
Qty: 90 TABLET | Refills: 1 | Status: SHIPPED | OUTPATIENT
Start: 2019-12-21 | End: 2020-06-17

## 2019-12-23 ENCOUNTER — TELEPHONE (OUTPATIENT)
Dept: FAMILY MEDICINE CLINIC | Facility: CLINIC | Age: 79
End: 2019-12-23

## 2020-01-03 ENCOUNTER — TELEPHONE (OUTPATIENT)
Dept: FAMILY MEDICINE CLINIC | Facility: CLINIC | Age: 80
End: 2020-01-03

## 2020-01-03 NOTE — TELEPHONE ENCOUNTER
[facility] called and stated that they received the Rx and letter of medical necessity for the Air mattress but she stated the Medicare will not accept that and they need actual chart notes as to why she needs the air mattress       Fax: [phone]

## 2020-01-06 NOTE — TELEPHONE ENCOUNTER
Spoke with Cheng at [facility], she will speak with patient's  to see if he can bring her in for an appointment, they are leaving for [place] 1/12, so it needs to be before that  Okay to use same day this week 30 mins

## 2020-01-08 ENCOUNTER — OFFICE VISIT (OUTPATIENT)
Dept: FAMILY MEDICINE CLINIC | Facility: CLINIC | Age: 80
End: 2020-01-08
Payer: MEDICARE

## 2020-01-08 VITALS
RESPIRATION RATE: 16 BRPM | HEART RATE: 67 BPM | SYSTOLIC BLOOD PRESSURE: 118 MMHG | OXYGEN SATURATION: 97 % | TEMPERATURE: 98.8 F | DIASTOLIC BLOOD PRESSURE: 72 MMHG

## 2020-01-08 DIAGNOSIS — L30.9 DERMATITIS: ICD-10-CM

## 2020-01-08 DIAGNOSIS — G30.1 LATE ONSET ALZHEIMER'S DISEASE WITHOUT BEHAVIORAL DISTURBANCE (HCC): Primary | Chronic | ICD-10-CM

## 2020-01-08 DIAGNOSIS — Z23 NEED FOR PROPHYLACTIC VACCINATION AND INOCULATION AGAINST INFLUENZA: ICD-10-CM

## 2020-01-08 DIAGNOSIS — N39.0 FREQUENT UTI: ICD-10-CM

## 2020-01-08 DIAGNOSIS — Z87.2 HISTORY OF PRESSURE INJURY OF SKIN: ICD-10-CM

## 2020-01-08 DIAGNOSIS — F02.80 LATE ONSET ALZHEIMER'S DISEASE WITHOUT BEHAVIORAL DISTURBANCE (HCC): Primary | Chronic | ICD-10-CM

## 2020-01-08 PROCEDURE — 99214 OFFICE O/P EST MOD 30 MIN: CPT | Performed by: FAMILY MEDICINE

## 2020-01-08 PROCEDURE — G0008 ADMIN INFLUENZA VIRUS VAC: HCPCS

## 2020-01-08 PROCEDURE — 90662 IIV NO PRSV INCREASED AG IM: CPT

## 2020-01-08 RX ORDER — KETOCONAZOLE 20 MG/G
CREAM TOPICAL DAILY
Qty: 30 G | Refills: 0 | Status: SHIPPED | OUTPATIENT
Start: 2020-01-08 | End: 2020-07-02

## 2020-01-08 NOTE — PROGRESS NOTES
Assessment/Plan:    No problem-specific Assessment & Plan notes found for this encounter  Diagnoses and all orders for this visit:    Late onset Alzheimer's disease without behavioral disturbance ([facility] [address] )  Comments:  Pt is stable on exam   Specialty mattress for pt is clearly medically necessary  We will fax office note to Medicare / Medical Supply  Orders:  -     Specialty Mattress    History of pressure injury of skin  Comments:  As above  Orders:  -     Specialty Mattress    Need for prophylactic vaccination and inoculation against influenza  Comments:  HD Flu Vaccine was given IM, and tolerated well  Orders:  -     influenza vaccine, 4216-3736, high-dose, PF 0 5 mL (FLUZONE HIGH-DOSE)    Dermatitis  Comments:  Possible right foot tinea pedis - treat with Ketoconazole Cream, and moisturizing of the skin  Orders:  -     ketoconazole (NIZORAL) 2 % cream; Apply topically daily for 14 days    Frequent UTI  Comments:  Stable at this time with daily, low dose Macrobid  Of Note (in addition to the above):    Adam Child is stable on exam  Had a long discussion with the pt's  and daughter today  Given Annalisa's advanced dementia and inability to adjust herself, and hx of recurrent pressure sores to her buttocks in the past, the pt appears to be an excellent candidate for an air-adjustable mattress (has used in the past, and pressure sores were not an issue for her)  We will fill out any paperwork that Medicare / the medical supply distributor requires, and then submit for her - they are aware that this may take some time to get final approval       Subjective:      Patient ID: Sammy Rodriguez is a 78 y o  female  Asade Child presents with her daughter and  Minesh Delgado) for a new air adjustable mattress  Has had a hx of bed sores in the past, when she had other types of mattresses and pads      Nido has to adjust her position 2 - 3 times per night to avoid this (pressure sores to her buttocks) from occurring  Her current mattress is non-supportive  She never had issues, when using an adjustable air mattress in the past   In her current mattress as per her , Adam Child "sinks" in her current mattress, and given her advanced dementia, cannot move herself out of that position on her own  This is why Wenceslao has to adjust her frequently Minesh Locus is also in his [de-identified] as well)  No current sores  She is being seen for her Face to Face Visit today  Pt has had no recent UTIs with her daily Macrobid (only one in recent months)  Does have a slight outbreak to her right foot which is very slowly improving  They are using moisturizers  She will be getting her Flu Vaccine today  [facility] had been coming out to their home until recently  The following portions of the patient's history were reviewed and updated as appropriate: allergies, current medications, past family history, past social history, past surgical history and problem list     Review of Systems   Constitutional: Negative for activity change, appetite change and fever  Respiratory: Negative for cough  Gastrointestinal: Negative for diarrhea  Genitourinary: Negative for frequency and urgency  Skin: Positive for rash  Negative for wound  Pt with a slowly improving rash to the lateral portion of the right foot  Neurological:        No mental status changes  Objective:      /72   Pulse 67   Temp 98 8 °F (37 1 °C)   Resp 16   SpO2 97%          Physical Exam   Constitutional: She appears well-developed and well-nourished  No distress  Adam Child presents in a wheelchair today, is alert; she is chronically non-verbal; will follow simple guiding instructions given by family; pt with hx of advanced Alzheimer's disease  She is in no acute distress  HENT:   Head: Normocephalic and atraumatic  Eyes: Conjunctivae are normal    Cardiovascular: Normal rate, regular rhythm and normal heart sounds   Exam reveals no gallop and no friction rub  No murmur heard  Pulmonary/Chest: Effort normal and breath sounds normal  No stridor  No respiratory distress  She has no wheezes  She has no rales  Abdominal: Soft  Bowel sounds are normal  She exhibits no distension and no mass  There is no tenderness  There is no rebound and no guarding  Musculoskeletal:        Feet:    Neurological: She is alert  Skin: She is not diaphoretic  Nursing note and vitals reviewed

## 2020-02-03 ENCOUNTER — OFFICE VISIT (OUTPATIENT)
Dept: FAMILY MEDICINE CLINIC | Facility: CLINIC | Age: 80
End: 2020-02-03
Payer: MEDICARE

## 2020-02-03 VITALS
HEART RATE: 70 BPM | SYSTOLIC BLOOD PRESSURE: 122 MMHG | DIASTOLIC BLOOD PRESSURE: 68 MMHG | OXYGEN SATURATION: 97 % | RESPIRATION RATE: 16 BRPM | TEMPERATURE: 98.4 F

## 2020-02-03 DIAGNOSIS — J06.9 VIRAL URI: Primary | ICD-10-CM

## 2020-02-03 DIAGNOSIS — C44.509 SKIN CANCER OF ANTERIOR CHEST: ICD-10-CM

## 2020-02-03 PROCEDURE — 3078F DIAST BP <80 MM HG: CPT | Performed by: FAMILY MEDICINE

## 2020-02-03 PROCEDURE — 3074F SYST BP LT 130 MM HG: CPT | Performed by: FAMILY MEDICINE

## 2020-02-03 PROCEDURE — 1160F RVW MEDS BY RX/DR IN RCRD: CPT | Performed by: FAMILY MEDICINE

## 2020-02-03 PROCEDURE — 4040F PNEUMOC VAC/ADMIN/RCVD: CPT | Performed by: FAMILY MEDICINE

## 2020-02-03 PROCEDURE — 99213 OFFICE O/P EST LOW 20 MIN: CPT | Performed by: FAMILY MEDICINE

## 2020-02-03 PROCEDURE — 1036F TOBACCO NON-USER: CPT | Performed by: FAMILY MEDICINE

## 2020-02-03 NOTE — PROGRESS NOTES
Assessment/Plan:    No problem-specific Assessment & Plan notes found for this encounter  Diagnoses and all orders for this visit:    Viral URI  Comments:  Pt stable on exam; viral URI  Treating supportively with OTC Cough/Cold Preps PRN, rest, and good PO hydration   to call if no improvement  Skin cancer of anterior chest  Comments:  Suspect SCC  Wenceslao and Solo Chen are leaving for [facility] for a few months at end of week - Wenceslao will have Solo Chen see his Dermatologist there  Subjective:      Patient ID: Alfonzo Ackerman is a 78 y o  female  Solo Chen presents today with her  Arpita Cosme), and daughter  Coughing and congested, mostly at night as per her  (worst was 2 nights ago)          The following portions of the patient's history were reviewed and updated as appropriate: allergies, current medications, past family history, past social history, past surgical history and problem list     Past Medical History:   Diagnosis Date    Acute cystitis without hematuria     Last assessed 12/19/15    Diverticulitis of colon     Hyperthyroidism     [facility]     Vertigo     last assessed 01/15/2013       Current Outpatient Medications:     acetaminophen (TYLENOL) 325 mg tablet, Take 3 tablets (975 mg total) by mouth every 8 (eight) hours, Disp: 30 tablet, Rfl: 0    amLODIPine (NORVASC) 5 mg tablet, TAKE 1 TABLET BY MOUTH EVERY DAY FOR HIGH BLOOD PRESSURE, Disp: 90 tablet, Rfl: 1    bisacodyl (DULCOLAX) 10 mg suppository, Insert 1 suppository (10 mg total) into the rectum daily as needed for constipation, Disp: 12 suppository, Rfl: 0    donepezil (ARICEPT) 5 mg tablet, Take 1 tablet (5 mg total) by mouth daily at bedtime, Disp: 30 tablet, Rfl: 5    ketoconazole (NIZORAL) 2 % cream, Apply topically daily for 14 days, Disp: 30 g, Rfl: 0    Melatonin 5 MG/15ML LIQD, Take 15 mL (5 mg total) by mouth daily at bedtime, Disp: 450 mL, Rfl: 5    nitrofurantoin (MACRODANTIN) 50 mg capsule, TAKE 1 CAPSULE(50 MG) BY MOUTH DAILY, Disp: 30 capsule, Rfl: 0    polyethylene glycol (MIRALAX) 17 g packet, Take 17 g by mouth daily, Disp: 30 each, Rfl: 5    traZODone (DESYREL) 50 mg tablet, Take 1 tablet (50 mg total) by mouth daily at bedtime for 30 days, Disp: 30 tablet, Rfl: 0    traZODone (DESYREL) 50 mg tablet, TAKE 1 TABLET(50 MG) BY MOUTH DAILY AT BEDTIME AS NEEDED FOR SLEEP, Disp: 90 tablet, Rfl: 1    No Known Allergies    Social History     Tobacco Use    Smoking status: Never Smoker    Smokeless tobacco: Never Used   Substance Use Topics    Alcohol use: No     Comment: social    Drug use: No         Review of Systems   Unable to perform ROS: Dementia   Constitutional: Negative for activity change and fever  HENT: Positive for congestion, rhinorrhea and sneezing  +Blister on her lip  Respiratory: Positive for cough  Cough at night  Skin: Positive for color change  Objective:      /68   Pulse 70   Temp 98 4 °F (36 9 °C)   Resp 16   SpO2 97%          Physical Exam   Constitutional: She appears well-developed and well-nourished  No distress  HENT:   Head: Normocephalic and atraumatic  Right Ear: Hearing, tympanic membrane, external ear and ear canal normal    Left Ear: Hearing, tympanic membrane, external ear and ear canal normal    Nose: Mucosal edema present  Mouth/Throat: Oropharynx is clear and moist  No oropharyngeal exudate  Eyes: Conjunctivae are normal    Neck: Normal range of motion  Neck supple  No JVD present  No thyromegaly present  Cardiovascular: Normal rate, regular rhythm and normal heart sounds  Exam reveals no gallop and no friction rub  No murmur heard  Pulmonary/Chest: Effort normal and breath sounds normal  No stridor  No respiratory distress  She has no wheezes  She has no rales  Lymphadenopathy:     She has no cervical adenopathy  Neurological: She is alert  Chronic dementia     Skin: She is not diaphoretic  Psychiatric: Her behavior is normal  Judgment and thought content normal  Her affect is blunt  She is noncommunicative  Nursing note and vitals reviewed

## 2020-02-24 ENCOUNTER — TELEPHONE (OUTPATIENT)
Dept: FAMILY MEDICINE CLINIC | Facility: CLINIC | Age: 80
End: 2020-02-24

## 2020-02-24 DIAGNOSIS — N30.00 ACUTE CYSTITIS WITHOUT HEMATURIA: Primary | ICD-10-CM

## 2020-02-24 RX ORDER — CEPHALEXIN 500 MG/1
500 CAPSULE ORAL EVERY 12 HOURS SCHEDULED
Qty: 14 CAPSULE | Refills: 0 | Status: SHIPPED | OUTPATIENT
Start: 2020-02-24 | End: 2020-03-02

## 2020-02-24 NOTE — TELEPHONE ENCOUNTER
PT SON CALLED AND SHE HAS A UTI AND THEY ARE IN [place] RIGHT NOW AND ASKED IF YOU COULD SEND SOMETHING TO [facility] FOR [place]

## 2020-04-23 DIAGNOSIS — N30.00 ACUTE CYSTITIS WITHOUT HEMATURIA: ICD-10-CM

## 2020-04-24 RX ORDER — NITROFURANTOIN MACROCRYSTALS 50 MG/1
CAPSULE ORAL
Qty: 30 CAPSULE | Refills: 0 | Status: SHIPPED | OUTPATIENT
Start: 2020-04-24 | End: 2020-05-22

## 2020-05-13 ENCOUNTER — TELEMEDICINE (OUTPATIENT)
Dept: FAMILY MEDICINE CLINIC | Facility: CLINIC | Age: 80
End: 2020-05-13
Payer: MEDICARE

## 2020-05-13 VITALS — TEMPERATURE: 97.2 F | DIASTOLIC BLOOD PRESSURE: 57 MMHG | SYSTOLIC BLOOD PRESSURE: 117 MMHG

## 2020-05-13 DIAGNOSIS — Z00.00 MEDICARE ANNUAL WELLNESS VISIT, SUBSEQUENT: ICD-10-CM

## 2020-05-13 DIAGNOSIS — Z13.1 SCREENING FOR DIABETES MELLITUS: ICD-10-CM

## 2020-05-13 DIAGNOSIS — G30.1 LATE ONSET ALZHEIMER'S DISEASE WITHOUT BEHAVIORAL DISTURBANCE (HCC): Primary | Chronic | ICD-10-CM

## 2020-05-13 DIAGNOSIS — N39.0 FREQUENT UTI: ICD-10-CM

## 2020-05-13 DIAGNOSIS — F02.80 LATE ONSET ALZHEIMER'S DISEASE WITHOUT BEHAVIORAL DISTURBANCE (HCC): Primary | Chronic | ICD-10-CM

## 2020-05-13 DIAGNOSIS — Z13.6 SCREENING FOR CARDIOVASCULAR CONDITION: ICD-10-CM

## 2020-05-13 PROCEDURE — 1036F TOBACCO NON-USER: CPT | Performed by: FAMILY MEDICINE

## 2020-05-13 PROCEDURE — 4040F PNEUMOC VAC/ADMIN/RCVD: CPT | Performed by: FAMILY MEDICINE

## 2020-05-13 PROCEDURE — 3074F SYST BP LT 130 MM HG: CPT | Performed by: FAMILY MEDICINE

## 2020-05-13 PROCEDURE — 1125F AMNT PAIN NOTED PAIN PRSNT: CPT | Performed by: FAMILY MEDICINE

## 2020-05-13 PROCEDURE — 3078F DIAST BP <80 MM HG: CPT | Performed by: FAMILY MEDICINE

## 2020-05-13 PROCEDURE — G0439 PPPS, SUBSEQ VISIT: HCPCS | Performed by: FAMILY MEDICINE

## 2020-05-13 PROCEDURE — 99213 OFFICE O/P EST LOW 20 MIN: CPT | Performed by: FAMILY MEDICINE

## 2020-05-13 PROCEDURE — 1170F FXNL STATUS ASSESSED: CPT | Performed by: FAMILY MEDICINE

## 2020-05-13 PROCEDURE — 1160F RVW MEDS BY RX/DR IN RCRD: CPT | Performed by: FAMILY MEDICINE

## 2020-05-22 DIAGNOSIS — N30.00 ACUTE CYSTITIS WITHOUT HEMATURIA: ICD-10-CM

## 2020-05-22 RX ORDER — NITROFURANTOIN MACROCRYSTALS 50 MG/1
CAPSULE ORAL
Qty: 30 CAPSULE | Refills: 0 | Status: SHIPPED | OUTPATIENT
Start: 2020-05-22 | End: 2020-06-24

## 2020-06-01 ENCOUNTER — CLINICAL SUPPORT (OUTPATIENT)
Dept: FAMILY MEDICINE CLINIC | Facility: CLINIC | Age: 80
End: 2020-06-01

## 2020-06-01 DIAGNOSIS — R35.0 URINARY FREQUENCY: Primary | ICD-10-CM

## 2020-06-01 PROCEDURE — 87086 URINE CULTURE/COLONY COUNT: CPT | Performed by: FAMILY MEDICINE

## 2020-06-01 PROCEDURE — RECHECK: Performed by: FAMILY MEDICINE

## 2020-06-03 LAB — BACTERIA UR CULT: NORMAL

## 2020-06-17 DIAGNOSIS — G47.00 INSOMNIA, UNSPECIFIED TYPE: ICD-10-CM

## 2020-06-17 RX ORDER — TRAZODONE HYDROCHLORIDE 50 MG/1
TABLET ORAL
Qty: 90 TABLET | Refills: 1 | Status: SHIPPED | OUTPATIENT
Start: 2020-06-17

## 2020-06-23 DIAGNOSIS — N30.00 ACUTE CYSTITIS WITHOUT HEMATURIA: ICD-10-CM

## 2020-06-24 DIAGNOSIS — I10 ESSENTIAL HYPERTENSION: ICD-10-CM

## 2020-06-24 RX ORDER — NITROFURANTOIN MACROCRYSTALS 50 MG/1
CAPSULE ORAL
Qty: 30 CAPSULE | Refills: 0 | Status: SHIPPED | OUTPATIENT
Start: 2020-06-24 | End: 2020-07-22

## 2020-06-24 RX ORDER — AMLODIPINE BESYLATE 5 MG/1
TABLET ORAL
Qty: 90 TABLET | Refills: 1 | Status: SHIPPED | OUTPATIENT
Start: 2020-06-24

## 2020-07-02 DIAGNOSIS — L30.9 DERMATITIS: ICD-10-CM

## 2020-07-02 RX ORDER — KETOCONAZOLE 20 MG/G
CREAM TOPICAL
Qty: 30 G | Refills: 5 | Status: SHIPPED | OUTPATIENT
Start: 2020-07-02

## 2020-07-22 DIAGNOSIS — N30.00 ACUTE CYSTITIS WITHOUT HEMATURIA: ICD-10-CM

## 2020-07-22 RX ORDER — NITROFURANTOIN MACROCRYSTALS 50 MG/1
CAPSULE ORAL
Qty: 30 CAPSULE | Refills: 5 | Status: SHIPPED | OUTPATIENT
Start: 2020-07-22

## 2020-10-06 ENCOUNTER — TELEPHONE (OUTPATIENT)
Dept: FAMILY MEDICINE CLINIC | Facility: CLINIC | Age: 80
End: 2020-10-06

## 2020-10-15 ENCOUNTER — IMMUNIZATIONS (OUTPATIENT)
Dept: FAMILY MEDICINE CLINIC | Facility: CLINIC | Age: 80
End: 2020-10-15
Payer: MEDICARE

## 2020-10-15 DIAGNOSIS — Z23 ENCOUNTER FOR IMMUNIZATION: ICD-10-CM

## 2020-10-15 PROCEDURE — 90662 IIV NO PRSV INCREASED AG IM: CPT

## 2020-10-15 PROCEDURE — G0008 ADMIN INFLUENZA VIRUS VAC: HCPCS

## 2020-10-21 ENCOUNTER — OFFICE VISIT (OUTPATIENT)
Dept: FAMILY MEDICINE CLINIC | Facility: CLINIC | Age: 80
End: 2020-10-21
Payer: MEDICARE

## 2020-10-21 VITALS
HEART RATE: 71 BPM | DIASTOLIC BLOOD PRESSURE: 64 MMHG | OXYGEN SATURATION: 95 % | TEMPERATURE: 98.7 F | RESPIRATION RATE: 16 BRPM | SYSTOLIC BLOOD PRESSURE: 120 MMHG

## 2020-10-21 DIAGNOSIS — L72.0 EIC (EPIDERMAL INCLUSION CYST): ICD-10-CM

## 2020-10-21 DIAGNOSIS — G30.1 LATE ONSET ALZHEIMER'S DISEASE WITHOUT BEHAVIORAL DISTURBANCE (HCC): Primary | Chronic | ICD-10-CM

## 2020-10-21 DIAGNOSIS — F02.80 LATE ONSET ALZHEIMER'S DISEASE WITHOUT BEHAVIORAL DISTURBANCE (HCC): Primary | Chronic | ICD-10-CM

## 2020-10-21 PROCEDURE — 99213 OFFICE O/P EST LOW 20 MIN: CPT | Performed by: FAMILY MEDICINE

## 2022-08-15 NOTE — ASSESSMENT & PLAN NOTE
Patient has history late onset Alzheimer's dementia and needs assistance with  activities of daily living  She has been taking care of at home by her     Currently not on any dementia medications  Continue supportive care  Fall precautions  Patient is a level 3 DNR External [place]/External FHR

## 2023-06-22 NOTE — TELEPHONE ENCOUNTER
Patients daughter called to get an update on where we are with sending chart notes to [facility]  Please advise Oxybutynin Counseling:  I discussed with the patient the risks of oxybutynin including but not limited to skin rash, drowsiness, dry mouth, difficulty urinating, and blurred vision.